# Patient Record
Sex: FEMALE | Race: BLACK OR AFRICAN AMERICAN | NOT HISPANIC OR LATINO | Employment: STUDENT | URBAN - METROPOLITAN AREA
[De-identification: names, ages, dates, MRNs, and addresses within clinical notes are randomized per-mention and may not be internally consistent; named-entity substitution may affect disease eponyms.]

---

## 2019-02-10 ENCOUNTER — HOSPITAL ENCOUNTER (INPATIENT)
Facility: HOSPITAL | Age: 24
LOS: 2 days | Discharge: HOME | DRG: 639 | End: 2019-02-12
Attending: EMERGENCY MEDICINE | Admitting: INTERNAL MEDICINE
Payer: MEDICAID

## 2019-02-10 DIAGNOSIS — R30.0 DYSURIA: ICD-10-CM

## 2019-02-10 DIAGNOSIS — E10.10 DIABETIC KETOACIDOSIS WITHOUT COMA ASSOCIATED WITH TYPE 1 DIABETES MELLITUS: Primary | ICD-10-CM

## 2019-02-10 LAB
ALBUMIN SERPL BCP-MCNC: 4.5 G/DL
ALLENS TEST: ABNORMAL
ALP SERPL-CCNC: 130 U/L
ALT SERPL W/O P-5'-P-CCNC: 21 U/L
ANION GAP SERPL CALC-SCNC: 15 MMOL/L
ANION GAP SERPL CALC-SCNC: 18 MMOL/L
ANION GAP SERPL CALC-SCNC: 26 MMOL/L
AST SERPL-CCNC: 21 U/L
B-HCG UR QL: NEGATIVE
B-OH-BUTYR BLD STRIP-SCNC: 6.3 MMOL/L
BACTERIA #/AREA URNS HPF: NORMAL /HPF
BASOPHILS # BLD AUTO: 0.03 K/UL
BASOPHILS NFR BLD: 0.2 %
BILIRUB SERPL-MCNC: 0.3 MG/DL
BILIRUB UR QL STRIP: NEGATIVE
BUN SERPL-MCNC: 14 MG/DL
BUN SERPL-MCNC: 20 MG/DL
BUN SERPL-MCNC: 23 MG/DL
CALCIUM SERPL-MCNC: 10.6 MG/DL
CALCIUM SERPL-MCNC: 9.3 MG/DL
CALCIUM SERPL-MCNC: 9.4 MG/DL
CHLORIDE SERPL-SCNC: 101 MMOL/L
CHLORIDE SERPL-SCNC: 111 MMOL/L
CHLORIDE SERPL-SCNC: 114 MMOL/L
CLARITY UR: CLEAR
CO2 SERPL-SCNC: 10 MMOL/L
CO2 SERPL-SCNC: 8 MMOL/L
CO2 SERPL-SCNC: 9 MMOL/L
COLOR UR: YELLOW
CREAT SERPL-MCNC: 0.9 MG/DL
CREAT SERPL-MCNC: 1 MG/DL
CREAT SERPL-MCNC: 1.4 MG/DL
CTP QC/QA: YES
DIFFERENTIAL METHOD: ABNORMAL
EOSINOPHIL # BLD AUTO: 0 K/UL
EOSINOPHIL NFR BLD: 0.3 %
ERYTHROCYTE [DISTWIDTH] IN BLOOD BY AUTOMATED COUNT: 12.9 %
EST. GFR  (AFRICAN AMERICAN): >60 ML/MIN/1.73 M^2
EST. GFR  (NON AFRICAN AMERICAN): 52 ML/MIN/1.73 M^2
EST. GFR  (NON AFRICAN AMERICAN): >60 ML/MIN/1.73 M^2
EST. GFR  (NON AFRICAN AMERICAN): >60 ML/MIN/1.73 M^2
ESTIMATED AVG GLUCOSE: 283 MG/DL
GLUCOSE SERPL-MCNC: 168 MG/DL
GLUCOSE SERPL-MCNC: 293 MG/DL
GLUCOSE SERPL-MCNC: 696 MG/DL
GLUCOSE UR QL STRIP: ABNORMAL
HBA1C MFR BLD HPLC: 11.5 %
HCO3 UR-SCNC: 11.1 MMOL/L (ref 24–28)
HCT VFR BLD AUTO: 43.8 %
HGB BLD-MCNC: 13.6 G/DL
HGB UR QL STRIP: ABNORMAL
KETONES UR QL STRIP: ABNORMAL
LACTATE SERPL-SCNC: 2.8 MMOL/L
LEUKOCYTE ESTERASE UR QL STRIP: NEGATIVE
LIPASE SERPL-CCNC: 20 U/L
LYMPHOCYTES # BLD AUTO: 2.4 K/UL
LYMPHOCYTES NFR BLD: 19.3 %
MAGNESIUM SERPL-MCNC: 2.4 MG/DL
MCH RBC QN AUTO: 30.4 PG
MCHC RBC AUTO-ENTMCNC: 31.1 G/DL
MCV RBC AUTO: 98 FL
MICROSCOPIC COMMENT: NORMAL
MONOCYTES # BLD AUTO: 0.6 K/UL
MONOCYTES NFR BLD: 5.1 %
NEUTROPHILS # BLD AUTO: 9.1 K/UL
NEUTROPHILS NFR BLD: 74.8 %
NITRITE UR QL STRIP: NEGATIVE
PCO2 BLDA: 34.5 MMHG (ref 35–45)
PH SMN: 7.12 [PH] (ref 7.35–7.45)
PH UR STRIP: 6 [PH] (ref 5–8)
PLATELET # BLD AUTO: 336 K/UL
PMV BLD AUTO: 11.7 FL
PO2 BLDA: 31 MMHG (ref 40–60)
POC BE: -18 MMOL/L
POC SATURATED O2: 42 % (ref 95–100)
POC TCO2: 12 MMOL/L (ref 24–29)
POCT GLUCOSE: 130 MG/DL (ref 70–110)
POCT GLUCOSE: 151 MG/DL (ref 70–110)
POCT GLUCOSE: 175 MG/DL (ref 70–110)
POCT GLUCOSE: 175 MG/DL (ref 70–110)
POCT GLUCOSE: 178 MG/DL (ref 70–110)
POCT GLUCOSE: 179 MG/DL (ref 70–110)
POCT GLUCOSE: 184 MG/DL (ref 70–110)
POCT GLUCOSE: 265 MG/DL (ref 70–110)
POCT GLUCOSE: 318 MG/DL (ref 70–110)
POCT GLUCOSE: 455 MG/DL (ref 70–110)
POTASSIUM SERPL-SCNC: 4.5 MMOL/L
POTASSIUM SERPL-SCNC: 4.6 MMOL/L
POTASSIUM SERPL-SCNC: 5 MMOL/L
PROT SERPL-MCNC: 8.7 G/DL
PROT UR QL STRIP: NEGATIVE
RBC # BLD AUTO: 4.47 M/UL
RBC #/AREA URNS HPF: 1 /HPF (ref 0–4)
SAMPLE: ABNORMAL
SITE: ABNORMAL
SODIUM SERPL-SCNC: 136 MMOL/L
SODIUM SERPL-SCNC: 136 MMOL/L
SODIUM SERPL-SCNC: 140 MMOL/L
SP GR UR STRIP: 1.01 (ref 1–1.03)
SQUAMOUS #/AREA URNS HPF: 5 /HPF
T4 FREE SERPL-MCNC: 0.87 NG/DL
TSH SERPL DL<=0.005 MIU/L-ACNC: 0.34 UIU/ML
URN SPEC COLLECT METH UR: ABNORMAL
UROBILINOGEN UR STRIP-ACNC: NEGATIVE EU/DL
WBC # BLD AUTO: 12.23 K/UL
WBC #/AREA URNS HPF: 5 /HPF (ref 0–5)
YEAST URNS QL MICRO: NORMAL

## 2019-02-10 PROCEDURE — 63600175 PHARM REV CODE 636 W HCPCS: Performed by: EMERGENCY MEDICINE

## 2019-02-10 PROCEDURE — 25000003 PHARM REV CODE 250: Performed by: STUDENT IN AN ORGANIZED HEALTH CARE EDUCATION/TRAINING PROGRAM

## 2019-02-10 PROCEDURE — 82010 KETONE BODYS QUAN: CPT

## 2019-02-10 PROCEDURE — 84443 ASSAY THYROID STIM HORMONE: CPT

## 2019-02-10 PROCEDURE — 84439 ASSAY OF FREE THYROXINE: CPT

## 2019-02-10 PROCEDURE — 96375 TX/PRO/DX INJ NEW DRUG ADDON: CPT

## 2019-02-10 PROCEDURE — 83605 ASSAY OF LACTIC ACID: CPT

## 2019-02-10 PROCEDURE — 96365 THER/PROPH/DIAG IV INF INIT: CPT

## 2019-02-10 PROCEDURE — 99900035 HC TECH TIME PER 15 MIN (STAT)

## 2019-02-10 PROCEDURE — 96361 HYDRATE IV INFUSION ADD-ON: CPT

## 2019-02-10 PROCEDURE — 85025 COMPLETE CBC W/AUTO DIFF WBC: CPT

## 2019-02-10 PROCEDURE — 83036 HEMOGLOBIN GLYCOSYLATED A1C: CPT

## 2019-02-10 PROCEDURE — 25000003 PHARM REV CODE 250: Performed by: EMERGENCY MEDICINE

## 2019-02-10 PROCEDURE — 94761 N-INVAS EAR/PLS OXIMETRY MLT: CPT

## 2019-02-10 PROCEDURE — 83690 ASSAY OF LIPASE: CPT

## 2019-02-10 PROCEDURE — 81000 URINALYSIS NONAUTO W/SCOPE: CPT

## 2019-02-10 PROCEDURE — 63600175 PHARM REV CODE 636 W HCPCS: Performed by: NURSE PRACTITIONER

## 2019-02-10 PROCEDURE — 87040 BLOOD CULTURE FOR BACTERIA: CPT

## 2019-02-10 PROCEDURE — S5010 5% DEXTROSE AND 0.45% SALINE: HCPCS | Performed by: STUDENT IN AN ORGANIZED HEALTH CARE EDUCATION/TRAINING PROGRAM

## 2019-02-10 PROCEDURE — 82962 GLUCOSE BLOOD TEST: CPT

## 2019-02-10 PROCEDURE — 36415 COLL VENOUS BLD VENIPUNCTURE: CPT

## 2019-02-10 PROCEDURE — 96366 THER/PROPH/DIAG IV INF ADDON: CPT

## 2019-02-10 PROCEDURE — 25000003 PHARM REV CODE 250: Performed by: NURSE PRACTITIONER

## 2019-02-10 PROCEDURE — 20000000 HC ICU ROOM

## 2019-02-10 PROCEDURE — 63600175 PHARM REV CODE 636 W HCPCS: Performed by: STUDENT IN AN ORGANIZED HEALTH CARE EDUCATION/TRAINING PROGRAM

## 2019-02-10 PROCEDURE — 80048 BASIC METABOLIC PNL TOTAL CA: CPT | Mod: 91

## 2019-02-10 PROCEDURE — 83735 ASSAY OF MAGNESIUM: CPT

## 2019-02-10 PROCEDURE — 82803 BLOOD GASES ANY COMBINATION: CPT

## 2019-02-10 PROCEDURE — 81025 URINE PREGNANCY TEST: CPT | Performed by: NURSE PRACTITIONER

## 2019-02-10 PROCEDURE — 99285 EMERGENCY DEPT VISIT HI MDM: CPT | Mod: 25

## 2019-02-10 PROCEDURE — 80053 COMPREHEN METABOLIC PANEL: CPT

## 2019-02-10 RX ORDER — INSULIN ASPART 100 [IU]/ML
1-8 INJECTION, SUSPENSION SUBCUTANEOUS
Status: ON HOLD | COMMUNITY
End: 2019-02-12 | Stop reason: HOSPADM

## 2019-02-10 RX ORDER — DEXTROSE MONOHYDRATE AND SODIUM CHLORIDE 5; .45 G/100ML; G/100ML
INJECTION, SOLUTION INTRAVENOUS CONTINUOUS
Status: DISCONTINUED | OUTPATIENT
Start: 2019-02-10 | End: 2019-02-11

## 2019-02-10 RX ORDER — SODIUM CHLORIDE, SODIUM LACTATE, POTASSIUM CHLORIDE, CALCIUM CHLORIDE 600; 310; 30; 20 MG/100ML; MG/100ML; MG/100ML; MG/100ML
INJECTION, SOLUTION INTRAVENOUS CONTINUOUS
Status: DISCONTINUED | OUTPATIENT
Start: 2019-02-10 | End: 2019-02-10

## 2019-02-10 RX ORDER — IBUPROFEN 200 MG
16 TABLET ORAL
Status: DISCONTINUED | OUTPATIENT
Start: 2019-02-10 | End: 2019-02-12 | Stop reason: HOSPADM

## 2019-02-10 RX ORDER — SODIUM CHLORIDE 0.9 % (FLUSH) 0.9 %
5 SYRINGE (ML) INJECTION
Status: DISCONTINUED | OUTPATIENT
Start: 2019-02-10 | End: 2019-02-12 | Stop reason: HOSPADM

## 2019-02-10 RX ORDER — IBUPROFEN 200 MG
24 TABLET ORAL
Status: DISCONTINUED | OUTPATIENT
Start: 2019-02-10 | End: 2019-02-12 | Stop reason: HOSPADM

## 2019-02-10 RX ORDER — ONDANSETRON 2 MG/ML
4 INJECTION INTRAMUSCULAR; INTRAVENOUS
Status: COMPLETED | OUTPATIENT
Start: 2019-02-10 | End: 2019-02-10

## 2019-02-10 RX ORDER — ONDANSETRON 2 MG/ML
4 INJECTION INTRAMUSCULAR; INTRAVENOUS
Status: ACTIVE | OUTPATIENT
Start: 2019-02-10 | End: 2019-02-10

## 2019-02-10 RX ORDER — DEXTROSE MONOHYDRATE 100 MG/ML
1000 INJECTION, SOLUTION INTRAVENOUS
Status: DISCONTINUED | OUTPATIENT
Start: 2019-02-10 | End: 2019-02-12 | Stop reason: HOSPADM

## 2019-02-10 RX ORDER — GLUCAGON 1 MG
1 KIT INJECTION
Status: DISCONTINUED | OUTPATIENT
Start: 2019-02-10 | End: 2019-02-12 | Stop reason: HOSPADM

## 2019-02-10 RX ORDER — HEPARIN SODIUM 5000 [USP'U]/ML
5000 INJECTION, SOLUTION INTRAVENOUS; SUBCUTANEOUS EVERY 12 HOURS
Status: DISCONTINUED | OUTPATIENT
Start: 2019-02-10 | End: 2019-02-12 | Stop reason: HOSPADM

## 2019-02-10 RX ADMIN — HEPARIN SODIUM 5000 UNITS: 5000 INJECTION, SOLUTION INTRAVENOUS; SUBCUTANEOUS at 09:02

## 2019-02-10 RX ADMIN — SODIUM CHLORIDE 1000 ML: 0.9 INJECTION, SOLUTION INTRAVENOUS at 10:02

## 2019-02-10 RX ADMIN — ONDANSETRON 4 MG: 2 INJECTION INTRAMUSCULAR; INTRAVENOUS at 10:02

## 2019-02-10 RX ADMIN — SODIUM CHLORIDE 4 UNITS/HR: 9 INJECTION, SOLUTION INTRAVENOUS at 12:02

## 2019-02-10 RX ADMIN — SODIUM CHLORIDE, SODIUM LACTATE, POTASSIUM CHLORIDE, AND CALCIUM CHLORIDE: .6; .31; .03; .02 INJECTION, SOLUTION INTRAVENOUS at 12:02

## 2019-02-10 RX ADMIN — SODIUM CHLORIDE 1000 ML: 0.9 INJECTION, SOLUTION INTRAVENOUS at 11:02

## 2019-02-10 RX ADMIN — DEXTROSE AND SODIUM CHLORIDE: 5; .45 INJECTION, SOLUTION INTRAVENOUS at 04:02

## 2019-02-10 NOTE — ED NOTES
Pt with nausea/vomiting that began this am.  Has been out of short acting insulin for approx 3 weeks.  Reports burning with urination and very slight abdominal cramping.  Hx of type 1 diabetes

## 2019-02-10 NOTE — LETTER
February 12, 2019    Karie Perez  [address]  [place] [address]                   [address] [place] [address]  Phone: [phone]  Fax: [phone]   February 12, 2019     Patient: Karie Perez   YOB: 1995   Date of Visit: 2/10/2019       To Whom it May Concern:    Karie Perez was seen in the hospital from 2/10/2019 til 2/12/2019. She may return to school on 2/13/2019.    If you have any questions or concerns, please don't hesitate to call.    Sincerely,         Vasu Blair MD

## 2019-02-10 NOTE — H&P
"[facility] Medicine H&P Note     Admitting Team: [facility]   Attending Physician: Fer Wills MD  Resident: Shi/Elian  Intern: Geraldo    Date of Admit: 2/10/2019    Chief Complaint     Vomiting (n/v that began this am with hx of Juvenile diabetes)   x1 morning     Subjective:      History of Present Illness:  Karie Perez is a 23 y.o. female who has a history of T1DM, pancreatitis and pyelonephritis. The patient presented to [facility] on 2/10/2019 with a primary complaint of vomiting x1 day.     The patient was in her usual state of health until the morning of admit when she woke up with acute onset of non-bloody, non-bilious vomiting. She reports she had several episodes prior to coming to hospital. Denies abdominal pain, nausea, decreased po intake, polyuria or polydipsia prior to admit. She has a history of T1DM and is prescribed Novolog 1-8U with meals and Basaglar 35AM/35PM but has been out of her short-acting insulin x2 weeks. She has had prior hospital admissions for DKA, though does not remember the last time, and says today's presentation is consistent. Her blood sugars at home have been running in the 300s but did not check this morning. She also complains of dysuria and increased frequency x several days, denies fever, chills or hematuria.       Past Medical History:  T1DM  Pancreatitis  Pyelonephritis, ~    Past Surgical History:  , 2018  Tympanostomy tube bilateral, remote    Allergies:  Ceclor - hives     Home Medications:  Novolog 8 U with meals  Basaglar 35AM/35PM  MV    Family History:  Mother - alive, healthy  Father - alive, healthy  Paternal grandfather - DM, HTN     Social History:  Tobacco - former, quit 1 week ago, 3 black and mild cigars/day x 2 years   EtOH - "occasional" bottle of beer, denies binge history  Drugs - denies IVDU, THC, cocaine   Lives with mother and infant son.     Review of Systems:  Pertinent " positives as noted in HPI. All other systems are reviewed and are negative.    Health Maintaince :   Primary Care Physician: Dr. Alexa Argueta, [facility]     Immunizations:   TDap: up to date   Flu: not up to date   Pna: not indicated     Cancer Screening:  PAP: up to date   MMG: not indicated   Colonoscopy: not indicated      Objective:   Last 24 Hour Vital Signs:  BP  Min: 105/65  Max: 118/59  Temp  Av.5 °F (36.9 °C)  Min: 98.5 °F (36.9 °C)  Max: 98.5 °F (36.9 °C)  Pulse  Av.5  Min: 99  Max: 112  Resp  Av  Min: 16  Max: 16  SpO2  Av.7 %  Min: 99 %  Max: 100 %  Height  Av' (152.4 cm)  Min: 5' (152.4 cm)  Max: 5' (152.4 cm)  Weight  Av.1 kg (106 lb)  Min: 48.1 kg (106 lb)  Max: 48.1 kg (106 lb)  Body mass index is 20.7 kg/m².  No intake/output data recorded.    Physical Examination:  Physical Exam   Constitutional: She is oriented to person, place, and time and well-developed, well-nourished, and in no distress. No distress.   HENT:   Head: Normocephalic and atraumatic.   Mouth/Throat: Oropharynx is clear and moist. No oropharyngeal exudate.   Eyes: Conjunctivae and EOM are normal. Pupils are equal, round, and reactive to light. No scleral icterus.   Neck: Normal range of motion. Neck supple. No JVD present. No thyromegaly present.   Cardiovascular: Normal rate, regular rhythm, normal heart sounds and intact distal pulses. Exam reveals no gallop and no friction rub.   No murmur heard.  Pulmonary/Chest: Effort normal and breath sounds normal. No stridor. No respiratory distress. She has no wheezes. She has no rales.   Abdominal: Soft. Bowel sounds are normal. She exhibits no distension and no mass. There is no tenderness. There is no rebound and no guarding.   Musculoskeletal: Normal range of motion. She exhibits no edema.   Lymphadenopathy:     She has no cervical adenopathy.   Neurological: She is alert and oriented to person, place, and time. GCS score  is 15.   Skin: Skin is warm and dry. No rash noted. She is not diaphoretic. No erythema. No pallor.     Laboratory:  Most Recent Data:  CBC:   Lab Results   Component Value Date    WBC 12.23 02/10/2019    HGB 13.6 02/10/2019    HCT 43.8 02/10/2019     02/10/2019    MCV 98 02/10/2019    RDW 12.9 02/10/2019     WBC Differential: 74.8% N, 19.3% L, 5.1% M, 0.3% Eo, 0.2% Baso  BMP:   Lab Results   Component Value Date     02/10/2019    K 4.6 02/10/2019     02/10/2019    CO2 9 (LL) 02/10/2019    BUN 23 (H) 02/10/2019    CREATININE 1.4 02/10/2019     (HH) 02/10/2019    CALCIUM 10.6 (H) 02/10/2019    MG 2.4 02/10/2019     LFTs:   Lab Results   Component Value Date    PROT 8.7 (H) 02/10/2019    ALBUMIN 4.5 02/10/2019    BILITOT 0.3 02/10/2019    AST 21 02/10/2019    ALKPHOS 130 02/10/2019    ALT 21 02/10/2019     Coags: No results found for: INR, PROTIME, PTT  FLP: No results found for: CHOL, HDL, LDLCALC, TRIG, CHOLHDL  DM:   Lab Results   Component Value Date    CREATININE 1.4 02/10/2019     Thyroid: No results found for: TSH, FREET4, F9VSFOR, Q2ZWTTR, THYROIDAB  Anemia: No results found for: IRON, TIBC, FERRITIN, STZITTIU27, FOLATE  Cardiac: No results found for: TROPONINI, CKTOTAL, CKMB, BNP  Urinalysis:   Lab Results   Component Value Date    COLORU Yellow 02/10/2019    SPECGRAV 1.015 02/10/2019    NITRITE Negative 02/10/2019    KETONESU 3+ (A) 02/10/2019    UROBILINOGEN Negative 02/10/2019    WBCUA 5 02/10/2019       Trended Lab Data:  Recent Labs   Lab 02/10/19  1034   WBC 12.23   HGB 13.6   HCT 43.8      MCV 98   RDW 12.9      K 4.6      CO2 9*   BUN 23*   CREATININE 1.4   *   PROT 8.7*   ALBUMIN 4.5   BILITOT 0.3   AST 21   ALKPHOS 130   ALT 21       Trended Cardiac Data:  No results for input(s): TROPONINI, CKTOTAL, CKMB, BNP in the last 168 hours.    Microbiology Data:  None at this time     Other Results:  EKG (my interpretation): none at time of note      Radiology:  None at this time      Assessment:     Karie Perez is a 23 y.o. female who has a history of T1DM, pancreatitis and pyelonephritis. The patient presented to [facility] on 2/10/2019 with a primary complaint of vomiting x1 day.     Plan:     DKA  - pt with history of T1DM, prescribed novolog 1-8U with meals and basaglar 35/35. Has been out of short acting x2 weeks. Home BS >300. Awoke day of admit with several episodes of NBNB vomiting consistent with prior DKA presentations.  - ED BMP with CO2 9  AG 26  glucose 696  Beta-hydroxybutyrate 6.3  Lactate 2.8   - VBG 7.116  34.5  31  11.1   - pt started on insulin gtt and IVFs  - check BMP q4h and BG q2h. Once gap closed and patient able to tolerate po intake, will start on SQ home insulin    Nausea and vomiting, improved   - likely due to above DKA  - zofran 4mg as needed, will continue to monitor     Dysuria   - pt reports history of pyelonephritis ~2014. Also complains of dysuria and frequency x days.   - UA with 3+ glucose, 3+ ketones, trace occult blood. No LE, nitrite or WBC.   - continue to monitor    Hypercalcemia  - on admit Ca2+ 10.6  corrected 10.2   - likely 2/2 dehydration, on IVFs  - continue to monitor    Elevated total protein   - on admit TP 8.7  - likely 2/2 dehydration, on IVFs  - will check HIV and Hepatitis panels  - continue to monitor    HCM   - PCP: Dr. Alexa Argueta  - Immunizations: UTD Tetanus, needs flu  - Pap: UTD  - MMG and c-scope: not indicated    Diet: NPO  PPx: Lovenox  Code: FULL     Dispo: pending clinical improvement, currently on insulin drip. Will check BMP q4 and blood glucose q2, start on SQ insulin once AG closed and tolerating diet.         Tamera Johnston MD  [facility]  [facility]     [facility] Medicine Hospitalist Pager numbers:   [facility] Hospitalist Medicine Team A (Doug/Shi): [phone]  [facility] Hospitalist Medicine Team B (Wojciech/Elma):   [phone]

## 2019-02-10 NOTE — ED PROVIDER NOTES
Encounter Date: 2/10/2019       History     Chief Complaint   Patient presents with    Vomiting     n/v that began this am with hx of Juvenile diabetes     Patient 23-year-old female past medical history of asthma and diabetes who presents to ED for evaluation of nausea and vomiting with this a.m. Patient reports three episodes of non-bloody, non-bilious vomiting this am. Denies any sick contacts. Patient is on 1-8 sliding scale of Humalog daily and takes 35 units of Lantus in the morning and at night. Patient reports that she has been out of her Humalog for approximately 2 weeks and has tried to get a hold of her PCP but has not been able to.  Patient denies any alleviating exacerbating factors.  Associates a little abdominal pain, dysuria, and SOB.  Denies fever, chills, body aches, neck pain/stiffness, chest pain, diarrhea, or any other concerns.      The history is provided by the patient.     Review of patient's allergies indicates:   Allergen Reactions    Ceclor [cefaclor] Hives     Past Medical History:   Diagnosis Date    Asthma     Diabetes mellitus      Past Surgical History:   Procedure Laterality Date    TYMPANOSTOMY TUBE PLACEMENT       Family History   Problem Relation Age of Onset    Diabetes Paternal Grandfather     Hypertension Paternal Grandfather      Social History     Tobacco Use    Smoking status: Never Smoker   Substance Use Topics    Alcohol use: Yes     Comment: socially    Drug use: No     Review of Systems   Constitutional: Negative for chills and fever.   Respiratory: Positive for shortness of breath.    Cardiovascular: Negative for chest pain.   Gastrointestinal: Positive for abdominal pain (a little ), nausea and vomiting. Negative for diarrhea.   Genitourinary: Positive for dysuria.   Musculoskeletal: Negative for back pain and neck stiffness.   Hematological: Does not bruise/bleed easily.   All other systems reviewed and are negative.      Physical Exam     Initial Vitals  [02/10/19 1017]   BP Pulse Resp Temp SpO2   105/65 (!) 112 16 98.5 °F (36.9 °C) 100 %      MAP       --         Physical Exam    Vitals reviewed.  Constitutional: She appears well-developed and well-nourished. She is cooperative.  Non-toxic appearance. She does not have a sickly appearance. She appears distressed.   HENT:   Head: Atraumatic.   Dry MM.     Eyes: EOM are normal.   Neck: Normal range of motion, full passive range of motion without pain and phonation normal. Neck supple.   Cardiovascular: Regular rhythm. Tachycardia present.    Pulmonary/Chest: Effort normal and breath sounds normal. No respiratory distress.   Abdominal:   Abdomen soft.  Non-distended.   Normal bowel sounds.  No guarding, rigidity, or rebound.  Actively vomiting.    Neurological: She is alert and oriented to person, place, and time.   Skin: Skin is warm, dry and intact.   Psychiatric: She has a normal mood and affect.         ED Course   Procedures  Labs Reviewed   URINALYSIS, REFLEX TO URINE CULTURE - Abnormal; Notable for the following components:       Result Value    Glucose, UA 3+ (*)     Ketones, UA 3+ (*)     Occult Blood UA Trace (*)     All other components within normal limits    Narrative:     Preferred Collection Type->Urine, Clean Catch   CBC W/ AUTO DIFFERENTIAL - Abnormal; Notable for the following components:    MCHC 31.1 (*)     Gran # (ANC) 9.1 (*)     Gran% 74.8 (*)     All other components within normal limits   COMPREHENSIVE METABOLIC PANEL - Abnormal; Notable for the following components:    CO2 9 (*)     Glucose 696 (*)     BUN, Bld 23 (*)     Calcium 10.6 (*)     Total Protein 8.7 (*)     Anion Gap 26 (*)     eGFR if non  52 (*)     All other components within normal limits    Narrative:      CO2, GLUE  critical result(s) called and verbal readback obtained   from Jose Flores RN., 02/10/2019 11:11   BETA - HYDROXYBUTYRATE, SERUM - Abnormal; Notable for the following components:     Beta-Hydroxybutyrate 6.3 (*)     All other components within normal limits   LACTIC ACID, PLASMA - Abnormal; Notable for the following components:    Lactate (Lactic Acid) 2.8 (*)     All other components within normal limits   ISTAT PROCEDURE - Abnormal; Notable for the following components:    POC PH 7.116 (*)     POC PCO2 34.5 (*)     POC PO2 31 (*)     POC HCO3 11.1 (*)     POC SATURATED O2 42 (*)     POC TCO2 12 (*)     All other components within normal limits   CULTURE, BLOOD   LIPASE   MAGNESIUM   MAGNESIUM   URINALYSIS MICROSCOPIC    Narrative:     Preferred Collection Type->Urine, Clean Catch   POCT URINE PREGNANCY   POCT GLUCOSE MONITORING CONTINUOUS   POCT GLUCOSE MONITORING CONTINUOUS          Imaging Results    None          Medical Decision Making:   History:   Old Medical Records: I decided to obtain old medical records.  Initial Assessment:   Patient presents to ED for evaluation of nausea vomiting since this a.m. History of type 1 diabetes and has been out of [facility] for approximately 2 weeks.  Appears distressed and nontoxic.  Afebrile. Dry MM.  Actively vomiting.  Abdomen soft.  Nondistended.  Normal bowel sounds. No guarding, rigidity, or rebound.  Clinical Tests:   Lab Tests: Ordered and Reviewed  ED Management:  POCT pregnancy, UA, labs, IV fluids, zofran, insulin drip, VBG     b-hydroxy 6.3.   Other:   I discussed test(s) with the performing physician.       <> Summary of the Findings: Care of this patient discussed with Dr. Peña who agrees with ED course   I have discussed this case with another health care provider.       <> Summary of the Discussion: Dr. Peña discussed patient's HPI and ED course with [facility] who will admit patient for further evaluation of DKA.   Upon re-evaluation, the patient's status has remained stable.  At this time, I believe the patient should be admitted to [facility]  for further evaluation and management of DKA.   [facility]  contacted and the case was discussed.    Additional recommendations at this time: insulin drip.   Dr. Wills with [facility] agrees with plan and will admit under their service.    The patient was updated with test results, overall impression, and further plan of care.  All questions were answered.  The patient expresses understanding and agrees with current plan.              Attending Attestation:         Attending Critical Care:   Critical Care Times:   ==============================================================  · Total Critical Care Time - exclusive of procedural time: 30 minutes.  ==============================================================  Critical care was necessary to treat or prevent imminent or life-threatening deterioration of the following conditions: metabolic crisis.       Attending ED Notes:   I provided a face to face evaluation of this patient.    I discussed the patient's care with Advanced Practice Clinician.    I reviewed their note and agree with the history, physical, assessment and plan.      This is an emergent evaluation of a patient who presented with nausea and vomiting.                   Clinical Impression:   The encounter diagnosis was Diabetic ketoacidosis without coma associated with type 1 diabetes mellitus.                             Satnam Castillo NP  02/10/19 1148       Remi Peña Jr., MD  02/12/19 2008

## 2019-02-11 LAB
ANION GAP SERPL CALC-SCNC: 10 MMOL/L
ANION GAP SERPL CALC-SCNC: 10 MMOL/L
ANION GAP SERPL CALC-SCNC: 12 MMOL/L
ANION GAP SERPL CALC-SCNC: 13 MMOL/L
ANION GAP SERPL CALC-SCNC: 14 MMOL/L
BACTERIA #/AREA URNS HPF: ABNORMAL /HPF
BASOPHILS # BLD AUTO: 0.04 K/UL
BASOPHILS NFR BLD: 0.4 %
BILIRUB UR QL STRIP: NEGATIVE
BUN SERPL-MCNC: 12 MG/DL
BUN SERPL-MCNC: 4 MG/DL
BUN SERPL-MCNC: 5 MG/DL
BUN SERPL-MCNC: 8 MG/DL
BUN SERPL-MCNC: 9 MG/DL
CALCIUM SERPL-MCNC: 8.4 MG/DL
CALCIUM SERPL-MCNC: 8.7 MG/DL
CALCIUM SERPL-MCNC: 8.8 MG/DL
CALCIUM SERPL-MCNC: 8.8 MG/DL
CALCIUM SERPL-MCNC: 9.1 MG/DL
CHLORIDE SERPL-SCNC: 107 MMOL/L
CHLORIDE SERPL-SCNC: 108 MMOL/L
CHLORIDE SERPL-SCNC: 108 MMOL/L
CHLORIDE SERPL-SCNC: 109 MMOL/L
CHLORIDE SERPL-SCNC: 109 MMOL/L
CLARITY UR: CLEAR
CO2 SERPL-SCNC: 12 MMOL/L
CO2 SERPL-SCNC: 13 MMOL/L
CO2 SERPL-SCNC: 13 MMOL/L
CO2 SERPL-SCNC: 18 MMOL/L
CO2 SERPL-SCNC: 19 MMOL/L
COLOR UR: YELLOW
CREAT SERPL-MCNC: 0.8 MG/DL
CREAT SERPL-MCNC: 0.9 MG/DL
DIFFERENTIAL METHOD: ABNORMAL
EOSINOPHIL # BLD AUTO: 0.1 K/UL
EOSINOPHIL NFR BLD: 0.7 %
ERYTHROCYTE [DISTWIDTH] IN BLOOD BY AUTOMATED COUNT: 12.9 %
EST. GFR  (AFRICAN AMERICAN): >60 ML/MIN/1.73 M^2
EST. GFR  (NON AFRICAN AMERICAN): >60 ML/MIN/1.73 M^2
GLUCOSE SERPL-MCNC: 178 MG/DL
GLUCOSE SERPL-MCNC: 211 MG/DL
GLUCOSE SERPL-MCNC: 218 MG/DL
GLUCOSE SERPL-MCNC: 225 MG/DL
GLUCOSE SERPL-MCNC: 237 MG/DL
GLUCOSE UR QL STRIP: ABNORMAL
HCT VFR BLD AUTO: 39.3 %
HGB BLD-MCNC: 12.7 G/DL
HGB UR QL STRIP: ABNORMAL
KETONES UR QL STRIP: ABNORMAL
LACTATE SERPL-SCNC: 0.6 MMOL/L
LEUKOCYTE ESTERASE UR QL STRIP: ABNORMAL
LYMPHOCYTES # BLD AUTO: 3.1 K/UL
LYMPHOCYTES NFR BLD: 27.3 %
MCH RBC QN AUTO: 30.4 PG
MCHC RBC AUTO-ENTMCNC: 32.3 G/DL
MCV RBC AUTO: 94 FL
MICROSCOPIC COMMENT: ABNORMAL
MONOCYTES # BLD AUTO: 1.1 K/UL
MONOCYTES NFR BLD: 9.3 %
NEUTROPHILS # BLD AUTO: 7.1 K/UL
NEUTROPHILS NFR BLD: 62.2 %
NITRITE UR QL STRIP: NEGATIVE
PH UR STRIP: 6 [PH] (ref 5–8)
PLATELET # BLD AUTO: 298 K/UL
PMV BLD AUTO: 10.9 FL
POCT GLUCOSE: 111 MG/DL (ref 70–110)
POCT GLUCOSE: 171 MG/DL (ref 70–110)
POCT GLUCOSE: 179 MG/DL (ref 70–110)
POCT GLUCOSE: 190 MG/DL (ref 70–110)
POCT GLUCOSE: 194 MG/DL (ref 70–110)
POCT GLUCOSE: 200 MG/DL (ref 70–110)
POCT GLUCOSE: 200 MG/DL (ref 70–110)
POCT GLUCOSE: 208 MG/DL (ref 70–110)
POCT GLUCOSE: 208 MG/DL (ref 70–110)
POCT GLUCOSE: 209 MG/DL (ref 70–110)
POCT GLUCOSE: 209 MG/DL (ref 70–110)
POCT GLUCOSE: 213 MG/DL (ref 70–110)
POCT GLUCOSE: 217 MG/DL (ref 70–110)
POCT GLUCOSE: 220 MG/DL (ref 70–110)
POCT GLUCOSE: 224 MG/DL (ref 70–110)
POCT GLUCOSE: 225 MG/DL (ref 70–110)
POCT GLUCOSE: 75 MG/DL (ref 70–110)
POTASSIUM SERPL-SCNC: 3.4 MMOL/L
POTASSIUM SERPL-SCNC: 3.5 MMOL/L
POTASSIUM SERPL-SCNC: 3.8 MMOL/L
POTASSIUM SERPL-SCNC: 3.8 MMOL/L
POTASSIUM SERPL-SCNC: 4.1 MMOL/L
PROT UR QL STRIP: NEGATIVE
RBC # BLD AUTO: 4.18 M/UL
RBC #/AREA URNS HPF: 2 /HPF (ref 0–4)
SODIUM SERPL-SCNC: 133 MMOL/L
SODIUM SERPL-SCNC: 134 MMOL/L
SODIUM SERPL-SCNC: 135 MMOL/L
SODIUM SERPL-SCNC: 136 MMOL/L
SODIUM SERPL-SCNC: 137 MMOL/L
SP GR UR STRIP: 1.02 (ref 1–1.03)
SQUAMOUS #/AREA URNS HPF: 8 /HPF
URN SPEC COLLECT METH UR: ABNORMAL
UROBILINOGEN UR STRIP-ACNC: NEGATIVE EU/DL
WBC # BLD AUTO: 11.41 K/UL
WBC #/AREA URNS HPF: 7 /HPF (ref 0–5)

## 2019-02-11 PROCEDURE — S5571 INSULIN DISPOS PEN 3 ML: HCPCS | Performed by: STUDENT IN AN ORGANIZED HEALTH CARE EDUCATION/TRAINING PROGRAM

## 2019-02-11 PROCEDURE — 63600175 PHARM REV CODE 636 W HCPCS: Performed by: STUDENT IN AN ORGANIZED HEALTH CARE EDUCATION/TRAINING PROGRAM

## 2019-02-11 PROCEDURE — 25000003 PHARM REV CODE 250: Performed by: STUDENT IN AN ORGANIZED HEALTH CARE EDUCATION/TRAINING PROGRAM

## 2019-02-11 PROCEDURE — 11000001 HC ACUTE MED/SURG PRIVATE ROOM

## 2019-02-11 PROCEDURE — S5010 5% DEXTROSE AND 0.45% SALINE: HCPCS | Performed by: STUDENT IN AN ORGANIZED HEALTH CARE EDUCATION/TRAINING PROGRAM

## 2019-02-11 PROCEDURE — 85025 COMPLETE CBC W/AUTO DIFF WBC: CPT

## 2019-02-11 PROCEDURE — 81000 URINALYSIS NONAUTO W/SCOPE: CPT

## 2019-02-11 PROCEDURE — 80048 BASIC METABOLIC PNL TOTAL CA: CPT | Mod: 91

## 2019-02-11 PROCEDURE — 80048 BASIC METABOLIC PNL TOTAL CA: CPT

## 2019-02-11 PROCEDURE — 36415 COLL VENOUS BLD VENIPUNCTURE: CPT

## 2019-02-11 PROCEDURE — 83605 ASSAY OF LACTIC ACID: CPT

## 2019-02-11 PROCEDURE — 94761 N-INVAS EAR/PLS OXIMETRY MLT: CPT

## 2019-02-11 RX ORDER — GUAIFENESIN 600 MG/1
600 TABLET, EXTENDED RELEASE ORAL 2 TIMES DAILY
Status: DISCONTINUED | OUTPATIENT
Start: 2019-02-11 | End: 2019-02-12 | Stop reason: HOSPADM

## 2019-02-11 RX ORDER — ACETAMINOPHEN 325 MG/1
650 TABLET ORAL EVERY 6 HOURS PRN
Status: DISCONTINUED | OUTPATIENT
Start: 2019-02-11 | End: 2019-02-12 | Stop reason: HOSPADM

## 2019-02-11 RX ORDER — INSULIN ASPART 100 [IU]/ML
10 INJECTION, SOLUTION INTRAVENOUS; SUBCUTANEOUS
Status: DISCONTINUED | OUTPATIENT
Start: 2019-02-11 | End: 2019-02-12

## 2019-02-11 RX ADMIN — GUAIFENESIN 600 MG: 600 TABLET, EXTENDED RELEASE ORAL at 11:02

## 2019-02-11 RX ADMIN — INSULIN DETEMIR 40 UNITS: 100 INJECTION, SOLUTION SUBCUTANEOUS at 02:02

## 2019-02-11 RX ADMIN — HEPARIN SODIUM 5000 UNITS: 5000 INJECTION, SOLUTION INTRAVENOUS; SUBCUTANEOUS at 08:02

## 2019-02-11 RX ADMIN — SODIUM BICARBONATE: 84 INJECTION, SOLUTION INTRAVENOUS at 11:02

## 2019-02-11 RX ADMIN — DEXTROSE AND SODIUM CHLORIDE: 5; .45 INJECTION, SOLUTION INTRAVENOUS at 06:02

## 2019-02-11 RX ADMIN — DEXTROSE AND SODIUM CHLORIDE: 5; .45 INJECTION, SOLUTION INTRAVENOUS at 12:02

## 2019-02-11 RX ADMIN — GUAIFENESIN 600 MG: 600 TABLET, EXTENDED RELEASE ORAL at 08:02

## 2019-02-11 RX ADMIN — ACETAMINOPHEN 650 MG: 325 TABLET ORAL at 11:02

## 2019-02-11 NOTE — PLAN OF CARE
Problem: Fall Injury Risk  Goal: Absence of Fall and Fall-Related Injury  Outcome: Ongoing (interventions implemented as appropriate)  Intervention: Identify and Manage Contributors to Fall Injury Risk   02/11/19 0326   Manage Acute Allergic Reaction   Medication Review/Management medications reviewed   Identify and Manage Contributors to Fall Injury Risk   Self-Care Promotion independence encouraged         Problem: Infection  Goal: Infection Symptom Resolution  Outcome: Ongoing (interventions implemented as appropriate)  Intervention: Prevent or Manage Infection   02/11/19 0326   Prevent or Manage Infection   Infection Management aseptic technique maintained

## 2019-02-11 NOTE — NURSING
Dr. Wills's team notified of anion gap- 15, potassium 4.5, Co2- 10, POCT glucose 184, D5 0.45% NS infusing at 150ml/hr, and insulin infusion at 1 unit/hr; no new orders at this time, instructed to continue to monitor

## 2019-02-11 NOTE — PLAN OF CARE
Problem: Diabetes Comorbidity  Goal: Blood Glucose Level Within Desired Range  Outcome: Ongoing (interventions implemented as appropriate)  Intervention: Maintain Glycemic Control   02/11/19 0325   Monitor and Manage Ketoacidosis   Glycemic Management blood glucose monitoring;insulin infusion adjusted

## 2019-02-11 NOTE — PLAN OF CARE
"Pt reports she has an 8 month old and has been independent with all adls including driving. Pt stated her mother can provide assistance and she will have transportation home when d/c. Pt informed Tn she "ran out" of her Insulin because her doctor did not call in another refill and her appointment isn't until 3/21/19. Tn informed pt of the [facility] and pt request to have prescription sent there upon d/c so she can go home with medications.  Tn gave pt d/c brochure and card and encouraged to call for any further needs/questions.      Tn called PCP office and attempted to scheduled follow up appt.  MESSAGE WAS SENT FOR FOLLOW UP WITHIN A WEEK . SOMEONE FROM OFFICE SHOULD CALL PATIENT       02/11/19 2785   Discharge Assessment   Assessment Type Discharge Planning Assessment   Confirmed/corrected address and phone number on facesheet? Yes   Assessment information obtained from? Patient   Expected Length of Stay (days) 2   Communicated expected length of stay with patient/caregiver yes   Prior to hospitilization cognitive status: Alert/Oriented   Prior to hospitalization functional status: Independent   Current cognitive status: Alert/Oriented   Current Functional Status: Needs Assistance   Lives With child(james), dependent   Able to Return to Prior Arrangements yes   Is patient able to care for self after discharge? Yes   Who are your caregiver(s) and their phone number(s)? Della (mother) [phone]   Patient's perception of discharge disposition home or selfcare   Readmission Within the Last 30 Days no previous admission in last 30 days   Patient currently being followed by outpatient case management? No   Patient currently receives any other outside agency services? No   Equipment Currently Used at Home glucometer   Do you have any problems affording any of your prescribed medications? No   Is the patient taking medications as prescribed? no   If no, which medications is patient not taking? " Insulin-    Does the patient have transportation home? Yes   Transportation Anticipated family or friend will provide   Does the patient receive services at the [facility]madin Clinic? No   Discharge Plan A Home with family   Discharge Plan B Home with family   DME Needed Upon Discharge  none   Patient/Family in Agreement with Plan yes

## 2019-02-11 NOTE — PLAN OF CARE
Problem: Adult Inpatient Plan of Care  Goal: Plan of Care Review  Outcome: Ongoing (interventions implemented as appropriate)  Pt weaned off insulin gtt today. Plan to transfer to floor once bed available. Plan of care reviewed with patient.

## 2019-02-12 VITALS
RESPIRATION RATE: 18 BRPM | SYSTOLIC BLOOD PRESSURE: 129 MMHG | HEIGHT: 60 IN | OXYGEN SATURATION: 91 % | HEART RATE: 95 BPM | BODY MASS INDEX: 44.75 KG/M2 | TEMPERATURE: 97 F | WEIGHT: 227.94 LBS | DIASTOLIC BLOOD PRESSURE: 81 MMHG

## 2019-02-12 LAB
ALBUMIN SERPL BCP-MCNC: 3.4 G/DL
ALP SERPL-CCNC: 89 U/L
ALT SERPL W/O P-5'-P-CCNC: 18 U/L
ANION GAP SERPL CALC-SCNC: 10 MMOL/L
AST SERPL-CCNC: 24 U/L
BASOPHILS # BLD AUTO: 0.03 K/UL
BASOPHILS NFR BLD: 0.4 %
BILIRUB SERPL-MCNC: 0.5 MG/DL
BUN SERPL-MCNC: 4 MG/DL
CALCIUM SERPL-MCNC: 9.2 MG/DL
CHLORIDE SERPL-SCNC: 107 MMOL/L
CO2 SERPL-SCNC: 24 MMOL/L
CREAT SERPL-MCNC: 0.6 MG/DL
DIFFERENTIAL METHOD: ABNORMAL
EOSINOPHIL # BLD AUTO: 0.2 K/UL
EOSINOPHIL NFR BLD: 2.6 %
ERYTHROCYTE [DISTWIDTH] IN BLOOD BY AUTOMATED COUNT: 12.9 %
EST. GFR  (AFRICAN AMERICAN): >60 ML/MIN/1.73 M^2
EST. GFR  (NON AFRICAN AMERICAN): >60 ML/MIN/1.73 M^2
GLUCOSE SERPL-MCNC: 36 MG/DL
HCT VFR BLD AUTO: 38.7 %
HGB BLD-MCNC: 12.8 G/DL
LYMPHOCYTES # BLD AUTO: 3.6 K/UL
LYMPHOCYTES NFR BLD: 52.4 %
MCH RBC QN AUTO: 30 PG
MCHC RBC AUTO-ENTMCNC: 33.1 G/DL
MCV RBC AUTO: 91 FL
MONOCYTES # BLD AUTO: 0.8 K/UL
MONOCYTES NFR BLD: 11.7 %
NEUTROPHILS # BLD AUTO: 2.2 K/UL
NEUTROPHILS NFR BLD: 32.8 %
PLATELET # BLD AUTO: 302 K/UL
PMV BLD AUTO: 10.9 FL
POCT GLUCOSE: 105 MG/DL (ref 70–110)
POCT GLUCOSE: 117 MG/DL (ref 70–110)
POCT GLUCOSE: 299 MG/DL (ref 70–110)
POCT GLUCOSE: 34 MG/DL (ref 70–110)
POCT GLUCOSE: 93 MG/DL (ref 70–110)
POTASSIUM SERPL-SCNC: 2.9 MMOL/L
PROT SERPL-MCNC: 6.8 G/DL
RBC # BLD AUTO: 4.26 M/UL
SODIUM SERPL-SCNC: 141 MMOL/L
WBC # BLD AUTO: 6.85 K/UL

## 2019-02-12 PROCEDURE — 25000003 PHARM REV CODE 250: Performed by: STUDENT IN AN ORGANIZED HEALTH CARE EDUCATION/TRAINING PROGRAM

## 2019-02-12 PROCEDURE — 80053 COMPREHEN METABOLIC PANEL: CPT

## 2019-02-12 PROCEDURE — 85025 COMPLETE CBC W/AUTO DIFF WBC: CPT

## 2019-02-12 PROCEDURE — 63600175 PHARM REV CODE 636 W HCPCS: Performed by: STUDENT IN AN ORGANIZED HEALTH CARE EDUCATION/TRAINING PROGRAM

## 2019-02-12 PROCEDURE — 36415 COLL VENOUS BLD VENIPUNCTURE: CPT

## 2019-02-12 RX ORDER — INSULIN ASPART 100 [IU]/ML
7 INJECTION, SOLUTION INTRAVENOUS; SUBCUTANEOUS
Status: DISCONTINUED | OUTPATIENT
Start: 2019-02-12 | End: 2019-02-12 | Stop reason: HOSPADM

## 2019-02-12 RX ORDER — INSULIN GLARGINE 100 [IU]/ML
25 INJECTION, SOLUTION SUBCUTANEOUS NIGHTLY
Qty: 15 ML | Refills: 2 | Status: SHIPPED | OUTPATIENT
Start: 2019-02-12 | End: 2023-05-03

## 2019-02-12 RX ORDER — POTASSIUM CHLORIDE 20 MEQ/1
40 TABLET, EXTENDED RELEASE ORAL
Status: COMPLETED | OUTPATIENT
Start: 2019-02-12 | End: 2019-02-12

## 2019-02-12 RX ORDER — INSULIN LISPRO 100 [IU]/ML
7 INJECTION, SOLUTION INTRAVENOUS; SUBCUTANEOUS
Qty: 6.3 ML | Refills: 2 | Status: SHIPPED | OUTPATIENT
Start: 2019-02-12 | End: 2020-02-12

## 2019-02-12 RX ORDER — BENZONATATE 100 MG/1
100 CAPSULE ORAL 3 TIMES DAILY PRN
Status: DISCONTINUED | OUTPATIENT
Start: 2019-02-12 | End: 2019-02-12 | Stop reason: HOSPADM

## 2019-02-12 RX ADMIN — INSULIN ASPART 7 UNITS: 100 INJECTION, SOLUTION INTRAVENOUS; SUBCUTANEOUS at 08:02

## 2019-02-12 RX ADMIN — POTASSIUM CHLORIDE 40 MEQ: 20 TABLET, EXTENDED RELEASE ORAL at 12:02

## 2019-02-12 RX ADMIN — POTASSIUM CHLORIDE 40 MEQ: 20 TABLET, EXTENDED RELEASE ORAL at 10:02

## 2019-02-12 RX ADMIN — GUAIFENESIN 600 MG: 600 TABLET, EXTENDED RELEASE ORAL at 08:02

## 2019-02-12 RX ADMIN — Medication 24 G: at 05:02

## 2019-02-12 RX ADMIN — BENZONATATE 100 MG: 100 CAPSULE ORAL at 12:02

## 2019-02-12 RX ADMIN — INSULIN ASPART 7 UNITS: 100 INJECTION, SOLUTION INTRAVENOUS; SUBCUTANEOUS at 12:02

## 2019-02-12 RX ADMIN — ACETAMINOPHEN 650 MG: 325 TABLET ORAL at 12:02

## 2019-02-12 RX ADMIN — HEPARIN SODIUM 5000 UNITS: 5000 INJECTION, SOLUTION INTRAVENOUS; SUBCUTANEOUS at 08:02

## 2019-02-12 RX ADMIN — POTASSIUM CHLORIDE 40 MEQ: 20 TABLET, EXTENDED RELEASE ORAL at 08:02

## 2019-02-12 NOTE — PLAN OF CARE
Discharge order noted, NO HH or DME noted. Endocrinology referral faxed to [facility] referral center [phone].     Discharge rounds on patient. Discussed followup appointments, blue discharge folder, discharge nurse will go over home medications and reasons for medications and final discharge instructions. All patient/caregiver questions answered. Patient verbalized understanding.    Follow-up With  Details  Why  Contact Info   Omar Limon NP  On 2/14/2019  time:3:15pm Hospital follow up  [address]  [address]  [facility] [address]  [phone]   [facility]    follow up with Endocrinology referral  [address] [facility] [address]  [phone]            02/12/19 1430   Final Note   Assessment Type Final Discharge Note   Anticipated Discharge Disposition Home   What phone number can be called within the next 1-3 days to see how you are doing after discharge? [phone]   Hospital Follow Up  Appt(s) scheduled? Yes   [facility] Referral Info   Post Acute Recommendation No Care   Robby Mackey RN-BC  Transitional Navigator  [phone]

## 2019-02-12 NOTE — NURSING
AVS and educational attachments shared with patient via [employer]. Discussed thoroughly. Patient verbalized clear understanding using teach back method. Notified bedside nurse of completion of education. At present no distress noted. Patient to be discharged via w/c with escort service and family with all of patient's belonings. Will cont to be available to patient and family and intervene prn.

## 2019-02-12 NOTE — PROGRESS NOTES
U Internal Medicine Resident HO-2 Progress Note    Subjective:      Karie Perez is a 23 y.o. AA female who is being followed by the [facility] IM service at [facility] for DKA.  Yesterday she was transitioned off the insulin drip and stepped down. Overnight she had glucose recorded at 75 and 93 then dropped to 36 on cmp this am. She felt a little symptomatic and responded to juice. She had been taking 70 units daily of long acting and only got 40 yesterday with 10 units of short acting. It is highly unlikely that she had been compliant with home insulin prior to coming in with this response. Will need to decrease insulin today. She is feeling well otherwise and sitting up and talkative today. She feels ready to go home.      Objective:   Last 24 Hour Vital Signs:  BP  Min: 101/63  Max: 129/59  Temp  Av.7 °F (37.1 °C)  Min: 98.2 °F (36.8 °C)  Max: 99.1 °F (37.3 °C)  Pulse  Av.5  Min: 81  Max: 113  Resp  Av.4  Min: 18  Max: 43  SpO2  Av.3 %  Min: 89 %  Max: 100 %  Weight  Av.4 kg (227 lb 15.3 oz)  Min: 103.4 kg (227 lb 15.3 oz)  Max: 103.4 kg (227 lb 15.3 oz)  I/O last 3 completed shifts:  In: 3507.3 [P.O.:240; I.V.:3267.3]  Out: 2375 [Urine:2375]    Physical Examination:  Gen: awake, alert, NAD, sitting up at bedside   HEENT: normocephalic, atraumatic, PERRL, EOMI, MMM  CV: RRR, no murmurs appreciated, no extra heart sounds  Lungs: CTAB, symmetric chest rise, no wheezing or crackles  GI: Soft, non tender, non distended, normoactive bowel sounds  Extrem: no edema, clubbing, or cyanosis  Skin: dry, warm, intact. No bruising or rashes.  Neuro: AAOx3, moving all extremities, sensation equal and symmetric         Laboratory:  Laboratory Data Reviewed: yes  Pertinent Findings:  Recent Labs   Lab 02/10/19  1034  19  0424  19  1131 19  1622 19  0528   WBC 12.23  --  11.41  --   --   --  6.85   HGB 13.6  --  12.7  --   --   --  12.8   HCT 43.8  --  39.3  --    --   --  38.7     --  298  --   --   --  302      < > 134*   < > 135* 137 141   K 4.6   < > 3.8   < > 3.5 3.4* 2.9*      < > 109   < > 107 108 107   CREATININE 1.4   < > 0.8   < > 0.8 0.8 0.6   BUN 23*   < > 9   < > 5* 4* 4*   CO2 9*   < > 13*   < > 18* 19* 24   ALT 21  --   --   --   --   --  18   AST 21  --   --   --   --   --  24    < > = values in this interval not displayed.         Microbiology Data Reviewed: yes  Pertinent Findings:  BCx NGTD      Radiology Data Reviewed: yes  Pertinent Findings:  No new imaging    Current Medications:     Infusions:       Scheduled:   guaiFENesin  600 mg Oral BID    heparin (porcine)  5,000 Units Subcutaneous Q12H    insulin aspart U-100  10 Units Subcutaneous TIDWM    insulin detemir U-100  40 Units Subcutaneous QHS    potassium chloride  40 mEq Oral Q2H        PRN:  acetaminophen, benzonatate, dextrose 10 % in water (D10W), dextrose 10 % in water (D10W), dextrose 50%, dextrose 50%, dextrose 50%, dextrose 50%, glucagon (human recombinant), glucose, glucose, influenza, sodium chloride 0.9%, sodium chloride 0.9%    Antibiotics and Day Number of Therapy:  None    Lines and Day Number of Therapy:  PIV    Assessment:     Karie Perez is a 23 y.o.female with  Patient Active Problem List    Diagnosis Date Noted    Diabetic ketoacidosis without coma associated with type 1 diabetes mellitus 02/10/2019    Dysuria 02/10/2019        Plan:     DKA  - pt with history of T1DM, prescribed novolog 1-8U with meals and basaglar 35/35. Has been out of short acting x2 weeks. Home BS >300. Awoke day of admit with several episodes of NBNB vomiting consistent with prior DKA presentations.  - ED BMP with CO2 9  AG 26  glucose 696  Beta-hydroxybutyrate 6.3  Lactate 2.8 (now 0.6)  - VBG 7.116  34.5  31  11.1   - pt started on insulin gtt and IVFs  - gap closed, bicarb corrected and stepped down yesterday with 40 levemir and 10 units with meals  - this am hypoglycemic  to 36. Decreased meal time insulin to 7 units      Nausea and vomiting, resolved   - likely due to above DKA  - zofran 4mg as needed, will continue to monitor  - resolved      Dysuria   - pt reports history of pyelonephritis ~2014. Also complains of dysuria and frequency x days.   - UA with 3+ glucose, 3+ ketones, trace occult blood. No LE, nitrite or WBC.   - no infection, resolved symptoms     Hypercalcemia, resolved   - on admit Ca2+ 10.6  now 8.8   - likely 2/2 dehydration, on IVFs  - continue to monitor     Elevated total protein   - on admit TP 8.7  - likely 2/2 dehydration, on IVFs  - will check HIV and Hepatitis panels  - continue to monitor     HCM   - PCP: Dr. Alexa Argueta  - Immunizations: UTD Tetanus, needs flu  - Pap: UTD  - MMG and c-scope: not indicated     Diet: NPO  PPx: Lovenox  Code: FULL      Dispo: decreased meal time insulin. Discharge today.       Ricarda Osullivan  [facility] Internal Medicine HO-2  U Internal Medicine Service Team B    [facility] Medicine Hospitalist Pager numbers:   U Hospitalist Medicine Team A (Doug/Shi): [phone]  [facility] Hospitalist Medicine Team B (Wojciech/Elma):  [phone]

## 2019-02-12 NOTE — DISCHARGE SUMMARY
[facility] Internal Medicine Discharge Summary    Primary Team: [facility] Team B  Attending Physician: Fer Wills MD  Resident: Shi  Intern: Jeannie    Date of Admit: 2/10/2019  Date of Discharge: 2/12/2019    Discharge to: Home  Condition: Good    Discharge Diagnoses     Patient Active Problem List   Diagnosis    Diabetic ketoacidosis without coma associated with type 1 diabetes mellitus    Dysuria       Consultants and Procedures     Consultants:  ANA    Procedures:   NA    Brief History of Present Illness      Karie Perez is a 23 y.o. AA female who  has a past medical history of Asthma and Diabetes mellitus.  The patient presented to [facility] on 2/10/2019 with a primary complaint of Vomiting (n/v that began this am with hx of Juvenile diabetes)  .     Ms. Perez is a 23 year old woman who presented to the ED in DKA. She had been out of her short acting insulin for about 2 weeks. She was admitted to the ICU on an insulin drip for initial glucose of almost 700 and bicarb 9 with beta hydroxybutyrate of 6.3. She tolerated the insulin infusion and fluids well and closed her anion gap and corrected her acidosis. Her home regimen was 35 units basaglar BID with novolog sliding scale. We gave her 40 units of long acting with 10 units meal time insulin which made her hypoglycemic to the 30s. Insulin regimen was changed to 25 units long acting with 7 units meal time insulin which she is tolerating well. She has a referral in to endocrinology.     For the full HPI please refer to the History & Physical from this admission.    Hospital Course By Problem with Pertinent Findings     DKA  - pt with history of T1DM, prescribed novolog 1-8U with meals and basaglar 35/35. Has been out of short acting x2 weeks. Home BS >300. Awoke day of admit with several episodes of NBNB vomiting consistent with prior DKA presentations.  - ED BMP with CO2 9  AG 26  glucose 696  Beta-hydroxybutyrate 6.3  Lactate 2.8 (now  0.6)  - VBG 7.116  34.5  31  11.1   - pt started on insulin gtt and IVFs  - gap closed, bicarb corrected and stepped down yesterday with 40 levemir and 10 units with meals  - this am hypoglycemic to 36. Decreased meal time insulin to 7 units and long acting to 25 units  - referral sent for endocrinology     Nausea and vomiting, resolved   - likely due to above DKA  - resolved with zofran     Dysuria   - pt reports history of pyelonephritis ~2014. Also complains of dysuria and frequency x days.   - UA with 3+ glucose, 3+ ketones, trace occult blood. No LE, nitrite or WBC.   - no infection, resolved symptoms     Hypercalcemia, resolved   - on admit Ca2+ 10.6  now 8.8   - likely 2/2 dehydration, on IVFs  - continue to monitor     Elevated total protein   - on admit TP 8.7  - likely 2/2 dehydration, on IVFs  - will check HIV and Hepatitis panels  - continue to monitor     HCM   - PCP: Dr. Alexa Argueta  - Immunizations: UTD Tetanus, needs flu  - Pap: UTD  - MMG and c-scope: not indicated        Discharge Medications        Medication List      START taking these medications    blood sugar diagnostic Strp  test blood sugar 2 (two) times daily with meals.     insulin aspart U-100 100 unit/mL Inpn pen  Commonly known as:  NovoLOG  Inject 7 Units into the skin 3 (three) times daily with meals.        CHANGE how you take these medications    insulin glargine 100 units/mL (3mL) SubQ pen  Commonly known as:  BASAGLAR KWIKPEN U-100 INSULIN  Inject 25 Units into the skin every evening.  What changed:    · medication strength  · how much to take  · when to take this  · Another medication with the same name was removed. Continue taking this medication, and follow the directions you see here.        CONTINUE taking these medications    HYDROcodone-acetaminophen 5-325 mg per tablet  Commonly known as:  NORCO  Take 1 tablet by mouth every 6 (six) hours as needed for Pain.        STOP taking these medications    insulin aspart  protamine-insulin aspart 100 unit/mL (70-30) Inpn pen  Commonly known as:  NovoLOG 70/30     insulin lispro 100 unit/mL injection        ASK your doctor about these medications    blood-glucose meter Misc  USE AS DIRECTED           Where to Get Your Medications      These medications were sent to [facility] [address] [address]    Hours:  Mon-Fri, 8a-5:30p Phone:  [phone]   · blood sugar diagnostic Strp  · blood-glucose meter Misc  · insulin aspart U-100 100 unit/mL Inpn pen  · insulin glargine 100 units/mL (3mL) SubQ pen         Discharge Information:   Diet:  diabetic    Physical Activity:  No restrictions    Instructions:  1. Take all medications as prescribed  2. Keep all follow-up appointments  3. Return to the hospital or call your primary care physicians if any worsening symptoms such as nausea, increased urination and thirst occur.      Follow-Up Appointments:  See PCP in 1-2 weeks, referral sent for endocrinology     Ricarda Osullivan  [facility] Internal Medicine, -

## 2019-02-12 NOTE — NURSING TRANSFER
Nursing Transfer Note      2/11/2019     Transfer To: 403    Transfer via wheelchair    Transfer with cardiac monitoring    Transported by RN and transport    Medicines sent:  levemir and aspart    Chart send with patient: Yes      Patient reassessed at: to be reassessed on floor    Upon arrival to floor: cardiac monitor applied, patient oriented to room, call bell in reach and bed in lowest position

## 2019-02-12 NOTE — NURSING
Received critical result with POC glucose of 34.   Pt asymptomatic, conversational and alert at this time.    Attending MD notified.  PRN glucose tablets and OJ provided to pt at this time.  Will adhere to hypoglycemia protocol - continue to recheck poc glucose and will continue to monitor.

## 2019-02-12 NOTE — PROGRESS NOTES
.Pharmacy New Medication Education    Patient accepted medication education.    Pharmacy educated patient on name and purpose of medications and possible side effects, using the teach-back method.     D/C acetaminophen tablet 650 mg   D/C benzonatate capsule 100 mg   D/C dextrose 10 % infusion   D/C dextrose 10 % infusion   D/C dextrose 50% injection 12.5 g   D/C dextrose 50% injection 12.5 g   D/C dextrose 50% injection 25 g   D/C dextrose 50% injection 25 g   D/C glucagon (human recombinant) injection 1 mg   D/C glucose chewable tablet 16 g   D/C glucose chewable tablet 24 g   D/C guaiFENesin 12 hr tablet 600 mg   D/C heparin (porcine) injection 5,000 Units   D/C influenza (FLUZONE QUADRIVALENT) vaccine 0.5 mL   D/C insulin aspart U-100 pen 7 Units   D/C insulin detemir U-100 pen 40 Units   D/C potassium chloride SA CR tablet 40 mEq   D/C sodium chloride 0.9% flush 5 mL   D/C sodium chloride 0.9% flush 5 mL       Learners of pharmacy medication education included:  Patient    Patient +/- learner response:  Verbalized Understanding, Teachback

## 2019-02-12 NOTE — DISCHARGE INSTRUCTIONS
Insulin Lispro injection (English) View Edit Remove  Injection Pen, Using an (English) View Edit Remove  Diabetic Ketoacidosis (English) View Edit Remove

## 2019-02-12 NOTE — PLAN OF CARE
Problem: Adult Inpatient Plan of Care  Goal: Plan of Care Review  Outcome: Ongoing (interventions implemented as appropriate)  A&Ox4, pt rested/slept majority of evening w/o complications and few disruptions.  Towards beginning of evening pt suffered from persistent cough which lead to development of a headache which was relieved by prn Tessalon Pearls x1 and prn Tylenol provided x1.   No n/v, no SOB.  Pt reports no new events or onset of new s/s.   Pt on room air, independent up ad liberty, accuchecks achs - no requirements met for insulin this shift.      Ref. Range 2/11/2019 21:04 2/11/2019 22:50   POCT Glucose Latest Ref Range: 70 - 110 mg/dL 75 93

## 2019-02-12 NOTE — PLAN OF CARE
Pt came back to her room and verbalized she decided to take a walk and watch the rain. Nurse educate pt to notify staff when getting out of bed or room for safety. Pt is not a fall risk. Pt verbalized understanding.

## 2019-02-12 NOTE — NURSING
Pt is a transfer from icu. Insulin drip still active on med list and no prn sliding scale insulin noted. Dr Hull notified. States she will take a look at it and fix it.

## 2019-02-12 NOTE — PLAN OF CARE
Patient's midday BMP with CO2 18, AG 10 and glucose 225. Stopped IVFs and started po diet with levemir 40U. Patient tolerated well without nausea, vomiting or abdominal pain. On 2h repeat BMP, CO2 19, AG 10 and glucose 237. Will stop insulin gtt and step patient down to floor. Orders in for insulin novolog 10U with meals starting this evening and levemir 40U qHS starting tomorrow. Labs changed to BMP qd and POCT glucose with meals and before bed.       Tamera Johnston  2/11/2019

## 2019-02-12 NOTE — PLAN OF CARE
Discharge paper given to pt at the bedside.VN will go over with discharge paper. IV removed and no bleeding noted. Telemetry removed.

## 2019-02-12 NOTE — PLAN OF CARE
Pt is not in room while nurse is getting report from off coming nurse. Pt is on tele. Notified OC and security.

## 2019-02-13 ENCOUNTER — PATIENT OUTREACH (OUTPATIENT)
Dept: ADMINISTRATIVE | Facility: CLINIC | Age: 24
End: 2019-02-13

## 2019-02-13 NOTE — PATIENT INSTRUCTIONS
Diabetic Ketoacidosis  Diabetic ketoacidosis (DKA) is a serious problem that can happen in people with diabetes. DKA should be treated as a medical emergency. This is because it can lead to coma or death. If you have the symptoms of DKA, get medical help right away.  DKA happens more often in people with type 1 diabetes. But it can happen in people with type 2 diabetes. It can also happen in women with diabetes during pregnancy. This is often known as gestational diabetes.  DKA happens when insulin levels are too low. Without enough insulin, sugar (glucose) cant get to the cells of your body. The glucose stays in the blood. This causes high blood glucose (hyperglycemia). Without glucose, your body breaks down stored fat for energy. When this happens, acids called ketones are released into the blood. This is known as ketosis. High levels of ketones (ketoacidosis) can be harmful to you. Hyperglycemia and ketoacidosis can also cause serious problems in the blood and your body, such as:  · Low levels of potassium (hypokalemia)  · Swelling inside the brain (cerebral edema)  · Fluid in the lungs (pulmonary edema)  · Damage to kidneys or other organs  What causes diabetic ketoacidosis?  In people with diabetes, DKA is most often caused by too little insulin in the body. It is also caused by:  · Poor management of diabetes  · Infections like a urinary tract infection or pneumonia  · Serious health problems, such as a heart attack  · Reactions to certain prescribed medicines and illicit drugs  Symptoms of diabetic ketoacidosis  DKA most often happens slowly over time. But it can worsen in a few hours if you are vomiting. The first symptoms are:  · Thirst and dry mouth  · Urinating a lot  · Belly pain  · Nausea or vomiting  · Breath that smells fruity (from the ketones)  Over time, these symptoms may happen:  · Dry or flushed skin  · Nausea and vomiting  · Loss of appetite  · Weight loss  · Belly pain  · Trouble  breathing  · Trouble thinking or confusion  · Feeling very tired or weak. This can lead to coma.  How is diabetic ketoacidosis diagnosed?  Your healthcare provider will ask about your medical history. He or she will give you a physical exam. You may also have these tests:  · Blood tests to check your glucose levels  · Blood tests to check your electrolytes, such as potassium and sodium  · Urine test to check for ketones  These tests are done to check for DKA, and monitor it over time.  How is diabetic ketoacidosis treated?  DKA needs treatment right away in the hospital. Treatment includes:  · Insulin. This is the main type of treatment. Insulin allows the cells to use the glucose in the blood. This lowers the levels of both blood glucose and ketones.  · Fluids and electrolytes. These are given through a vein (IV). Fluids are replaced and abnormal electrolyte levels are corrected.  · Other medicines. These may be given to treat an illness that caused DKA. For example, antibiotics may be given to treat a urinary tract infection that caused DKA.  Preventing diabetic ketoacidosis  To help prevent DKA, make sure you:  · Take all of your medicines for diabetes exactly as prescribed. This includes insulin.  · Check your blood glucose levels exactly as instructed.  · Be especially careful when you are sick with an illness or an infection. Take extra care to follow diabetes care instructions. Check your blood glucose more often.  · Do not exercise when your blood sugar is high and you have ketones in your urine.   · Check your urine ketone levels if told to do so. This is done with a urine test strip. Ask your healthcare provider how often to check your urine.     When to call your healthcare provider  Call your healthcare provider right away if you:  · Have symptoms of DKA  · Have very high blood glucose levels  · Are getting sick with another illness   Date Last Reviewed: 7/1/2016  © 0483-5216 [employer]  [address] [place] [address]. All rights reserved. This information is not intended as a substitute for professional medical care. Always follow your healthcare professional's instructions.

## 2019-02-15 LAB — BACTERIA BLD CULT: NORMAL

## 2019-04-16 ENCOUNTER — HOSPITAL ENCOUNTER (EMERGENCY)
Facility: HOSPITAL | Age: 24
Discharge: HOME | End: 2019-04-16
Attending: EMERGENCY MEDICINE
Payer: MEDICAID

## 2019-04-16 VITALS
RESPIRATION RATE: 16 BRPM | OXYGEN SATURATION: 98 % | TEMPERATURE: 99 F | WEIGHT: 107 LBS | DIASTOLIC BLOOD PRESSURE: 71 MMHG | HEIGHT: 60 IN | HEART RATE: 86 BPM | BODY MASS INDEX: 21.01 KG/M2 | SYSTOLIC BLOOD PRESSURE: 106 MMHG

## 2019-04-16 DIAGNOSIS — M79.674 TOE PAIN, RIGHT: Primary | ICD-10-CM

## 2019-04-16 LAB
B-HCG UR QL: NEGATIVE
CTP QC/QA: YES
GLUCOSE SERPL-MCNC: 54 MG/DL (ref 70–110)
POCT GLUCOSE: 120 MG/DL (ref 70–110)
POCT GLUCOSE: 50 MG/DL (ref 70–110)
POCT GLUCOSE: 54 MG/DL (ref 70–110)

## 2019-04-16 PROCEDURE — 25000003 PHARM REV CODE 250: Performed by: PHYSICIAN ASSISTANT

## 2019-04-16 PROCEDURE — 81025 URINE PREGNANCY TEST: CPT | Performed by: PHYSICIAN ASSISTANT

## 2019-04-16 PROCEDURE — 99283 EMERGENCY DEPT VISIT LOW MDM: CPT | Mod: 25

## 2019-04-16 PROCEDURE — 82962 GLUCOSE BLOOD TEST: CPT

## 2019-04-16 RX ORDER — ACETAMINOPHEN 500 MG
1000 TABLET ORAL
Status: COMPLETED | OUTPATIENT
Start: 2019-04-16 | End: 2019-04-16

## 2019-04-16 RX ADMIN — ACETAMINOPHEN 1000 MG: 500 TABLET ORAL at 08:04

## 2019-04-17 NOTE — ED PROVIDER NOTES
Encounter Date: 4/16/2019       History     Chief Complaint   Patient presents with    Toe Injury     right pinky toe injury yesterday secondary to hitting the bed frame.  Patient is concerned because she is diabetic.     23-year-old female presents the ED with complaints of right 5th toe pain x1 day.  Patient states she hit her toe on her dresser yesterday and has had pain since.  Also admits to mild swelling and bruising.  Denies lacerations or abrasions.  She has not taken anything for the pain.    The history is provided by the patient. No  was used.     Review of patient's allergies indicates:   Allergen Reactions    Ceclor [cefaclor] Hives     Past Medical History:   Diagnosis Date    Asthma     Diabetes mellitus      Past Surgical History:   Procedure Laterality Date    TYMPANOSTOMY TUBE PLACEMENT       Family History   Problem Relation Age of Onset    Diabetes Paternal Grandfather     Hypertension Paternal Grandfather      Social History     Tobacco Use    Smoking status: Never Smoker   Substance Use Topics    Alcohol use: Yes     Comment: socially    Drug use: No     Review of Systems   Musculoskeletal: Positive for myalgias.   Skin: Positive for color change. Negative for wound.   All other systems reviewed and are negative.      Physical Exam     Initial Vitals [04/16/19 1848]   BP Pulse Resp Temp SpO2   119/71 95 16 98.4 °F (36.9 °C) 98 %      MAP       --         Physical Exam    Nursing note and vitals reviewed.  Constitutional: Vital signs are normal. She appears well-developed and well-nourished. No distress.   HENT:   Head: Normocephalic and atraumatic.   Nose: Nose normal.   Eyes: Conjunctivae and lids are normal.   Neck: Normal range of motion and phonation normal.   Cardiovascular: Normal rate.   Pulmonary/Chest: Effort normal.   Abdominal: Normal appearance.   Musculoskeletal:        Right foot: There is decreased range of motion, tenderness (To right 5th toe) and  swelling. There is normal capillary refill, no crepitus, no deformity and no laceration.        Feet:    Bruising noted to right 5th toe.  No decreased sensation.  No 5th metacarpal tenderness   Neurological: She is alert and oriented to person, place, and time.   Skin: Skin is warm and dry.   Psychiatric: She has a normal mood and affect. Her speech is normal and behavior is normal. Judgment and thought content normal. Cognition and memory are normal.         ED Course   Procedures  Labs Reviewed   POCT GLUCOSE - Abnormal; Notable for the following components:       Result Value    POCT Glucose 54 (*)     All other components within normal limits   POCT GLUCOSE - Abnormal; Notable for the following components:    POCT Glucose 50 (*)     All other components within normal limits   POCT URINE PREGNANCY          Imaging Results           X-Ray Toe 2 or More Views Right (Final result)  Result time 04/16/19 20:09:55    Final result by Ashley Banuelos MD (04/16/19 20:09:55)                 Impression:      Bony resorption of the head of the middle phalanx of the 5th digit.  Etiologies may be infectious ie osteomyelitis, metabolic ie hyperparathyroidism, and less likely neoplastic.  Recommend clinical correlation.  If warranted, consider MRI.    This report was flagged in Epic as abnormal.      Electronically signed by: Ashley Banuelos  Date:    04/16/2019  Time:    20:09             Narrative:    EXAMINATION:  TWO VIEWS OF THE RIGHT TOES    CLINICAL HISTORY:  right 5th toe pain;    TECHNIQUE:  AP, lateral and oblique views of the right toes.    COMPARISON:  None.    FINDINGS:  The three views of the right toes demonstrates bony resorption of the head of the middle phalanx of the 5th digit.  There is no acute fracture or dislocation.                                 Medical Decision Making:   Initial Assessment:   23-year-old female with right 5th toe pain after hitting it on her dresser yesterday.  Of note patient's  blood glucose was in the 50s when she arrived in the ED.  She was given a tray of food and glucose improved to 120.  Differential Diagnosis:   Fracture, contusion, dislocation,  Clinical Tests:   Radiological Study: Ordered and Reviewed  ED Management:  Patient given Tylenol in ED.  X-ray shows Bony resorption of the head of the middle phalanx of the 5th digit.  Etiologies may be infectious ie osteomyelitis, metabolic ie hyperparathyroidism, and less likely neoplastic.  I do not suspect these etiologies as the patient has a history of recent trauma to the 5th digit.  I discussed these results with her and instructed her to alternate Tylenol and Motrin every 3 hr, ice digit, and follow up with her PCP in 1 week for recheck and possible reimaging.  Rid return precautions given.  Patient states understanding and agreement treatment plan              Attending Attestation:     Physician Attestation Statement for NP/PA:   I discussed this assessment and plan of this patient with the NP/PA, but I did not personally examine the patient. The face to face encounter was performed by the NP/PA.                     Clinical Impression:       ICD-10-CM ICD-9-CM   1. Toe pain, right M79.674 729.5                                Snehal Mata PA-C  04/16/19 2045       Riccardo Brunson MD  04/16/19 2048

## 2019-04-17 NOTE — DISCHARGE INSTRUCTIONS
Alternate tylenol and motrin every 3 hours and ice toe. Follow up with your PCP in 1 week for recheck and possible x-ray.

## 2019-04-17 NOTE — ED NOTES
Pt presents to the ED with c/o right 5th toe pain. Pt states she stubbed her toe on her bed last night and has had increasing pain since. Pt ambulatory to room. Swelling noted to toe, no skin break noted. Pt denies numbness or tingling to toe.

## 2019-09-06 ENCOUNTER — HOSPITAL ENCOUNTER (EMERGENCY)
Facility: HOSPITAL | Age: 24
Discharge: HOME | End: 2019-09-06
Attending: EMERGENCY MEDICINE
Payer: MEDICAID

## 2019-09-06 VITALS
HEART RATE: 90 BPM | WEIGHT: 110 LBS | BODY MASS INDEX: 21.6 KG/M2 | TEMPERATURE: 98 F | DIASTOLIC BLOOD PRESSURE: 77 MMHG | RESPIRATION RATE: 16 BRPM | OXYGEN SATURATION: 99 % | HEIGHT: 60 IN | SYSTOLIC BLOOD PRESSURE: 124 MMHG

## 2019-09-06 DIAGNOSIS — A59.00 UROGENITAL INFECTION BY TRICHOMONAS VAGINALIS: Primary | ICD-10-CM

## 2019-09-06 LAB
B-HCG UR QL: NEGATIVE
BACTERIA #/AREA URNS HPF: ABNORMAL /HPF
BACTERIA GENITAL QL WET PREP: ABNORMAL
BILIRUB UR QL STRIP: NEGATIVE
CLARITY UR: CLEAR
CLUE CELLS VAG QL WET PREP: ABNORMAL
COLOR UR: YELLOW
CTP QC/QA: YES
FILAMENT FUNGI VAG WET PREP-#/AREA: ABNORMAL
GLUCOSE UR QL STRIP: NEGATIVE
HGB UR QL STRIP: ABNORMAL
HYALINE CASTS #/AREA URNS LPF: 1 /LPF
KETONES UR QL STRIP: NEGATIVE
LEUKOCYTE ESTERASE UR QL STRIP: NEGATIVE
MICROSCOPIC COMMENT: ABNORMAL
NITRITE UR QL STRIP: NEGATIVE
PH UR STRIP: 8 [PH] (ref 5–8)
POCT GLUCOSE: 237 MG/DL (ref 70–110)
PROT UR QL STRIP: ABNORMAL
RBC #/AREA URNS HPF: 50 /HPF (ref 0–4)
SP GR UR STRIP: 1.02 (ref 1–1.03)
SPECIMEN SOURCE: ABNORMAL
T VAGINALIS GENITAL QL WET PREP: ABNORMAL
URN SPEC COLLECT METH UR: ABNORMAL
UROBILINOGEN UR STRIP-ACNC: NEGATIVE EU/DL
WBC #/AREA URNS HPF: 5 /HPF (ref 0–5)
WBC #/AREA VAG WET PREP: ABNORMAL
YEAST GENITAL QL WET PREP: ABNORMAL

## 2019-09-06 PROCEDURE — 81000 URINALYSIS NONAUTO W/SCOPE: CPT

## 2019-09-06 PROCEDURE — 87210 SMEAR WET MOUNT SALINE/INK: CPT

## 2019-09-06 PROCEDURE — 87491 CHLMYD TRACH DNA AMP PROBE: CPT

## 2019-09-06 PROCEDURE — 25000003 PHARM REV CODE 250: Performed by: PHYSICIAN ASSISTANT

## 2019-09-06 PROCEDURE — 81025 URINE PREGNANCY TEST: CPT | Performed by: EMERGENCY MEDICINE

## 2019-09-06 PROCEDURE — 82962 GLUCOSE BLOOD TEST: CPT

## 2019-09-06 PROCEDURE — 99284 EMERGENCY DEPT VISIT MOD MDM: CPT | Mod: 25

## 2019-09-06 RX ORDER — ONDANSETRON 4 MG/1
4 TABLET, ORALLY DISINTEGRATING ORAL
Status: COMPLETED | OUTPATIENT
Start: 2019-09-06 | End: 2019-09-06

## 2019-09-06 RX ORDER — ONDANSETRON 4 MG/1
4 TABLET, ORALLY DISINTEGRATING ORAL EVERY 8 HOURS PRN
Qty: 30 TABLET | Refills: 0 | Status: SHIPPED | OUTPATIENT
Start: 2019-09-06 | End: 2023-05-03

## 2019-09-06 RX ORDER — METRONIDAZOLE 500 MG/1
2000 TABLET ORAL
Status: COMPLETED | OUTPATIENT
Start: 2019-09-06 | End: 2019-09-06

## 2019-09-06 RX ORDER — AZITHROMYCIN 500 MG/1
2000 TABLET, FILM COATED ORAL ONCE
Qty: 4 TABLET | Refills: 0 | Status: SHIPPED | OUTPATIENT
Start: 2019-09-06 | End: 2019-09-06 | Stop reason: SDUPTHER

## 2019-09-06 RX ORDER — AZITHROMYCIN 500 MG/1
2000 TABLET, FILM COATED ORAL ONCE
Qty: 4 TABLET | Refills: 0 | Status: SHIPPED | OUTPATIENT
Start: 2019-09-06 | End: 2019-09-06

## 2019-09-06 RX ADMIN — METRONIDAZOLE 2000 MG: 500 TABLET, FILM COATED ORAL at 10:09

## 2019-09-06 RX ADMIN — ONDANSETRON 4 MG: 4 TABLET, ORALLY DISINTEGRATING ORAL at 10:09

## 2019-09-07 NOTE — ED PROVIDER NOTES
Encounter Date: 9/6/2019       History     Chief Complaint   Patient presents with    Dysuria     Patient presents to the ED with reports of having dysuria with frequency that started x 2 days ago. No treatment attempted prior to arrival.      Karie Perez 23 y.o. Afebrile female with PMH of asthma and DM presented to the ED with c/o  complaint. She states for the past two days she has had urinary urgency and dysuria. She describes some associated suprapubic pressure and that symptoms are more severe at the end of her stream. She does state the she noted scant dark brown vaginal discharge today. She denies any concern for STI however states she had trichomoniasis in the past. She denies any fever, chills, N/V/D, hematuria, flank pain or rash. She has not tried any medications for the symptoms. She does state BG has been in good control up until tonight which she attributes to eating just PTA.       The history is provided by the patient.     Review of patient's allergies indicates:   Allergen Reactions    Ceclor [cefaclor] Hives     Past Medical History:   Diagnosis Date    Asthma     Diabetes mellitus      Past Surgical History:   Procedure Laterality Date    TYMPANOSTOMY TUBE PLACEMENT       Family History   Problem Relation Age of Onset    Diabetes Paternal Grandfather     Hypertension Paternal Grandfather      Social History     Tobacco Use    Smoking status: Never Smoker   Substance Use Topics    Alcohol use: Yes     Comment: socially    Drug use: No     Review of Systems   Constitutional: Negative for chills and fever.   Respiratory: Negative for shortness of breath.    Cardiovascular: Negative for chest pain.   Gastrointestinal: Positive for abdominal pain (suprapubic pressure). Negative for constipation, diarrhea, nausea and vomiting.   Endocrine: Negative for polydipsia and polyuria.   Genitourinary: Positive for dysuria, frequency, urgency and vaginal discharge. Negative for difficulty  urinating, flank pain, genital sores, hematuria, menstrual problem, vaginal bleeding and vaginal pain.   Musculoskeletal: Negative for arthralgias and back pain.   Skin: Negative for rash.   Neurological: Negative for dizziness, weakness and headaches.   Hematological: Does not bruise/bleed easily.       Physical Exam     Initial Vitals [09/06/19 2042]   BP Pulse Resp Temp SpO2   120/75 106 18 97.7 °F (36.5 °C) 100 %      MAP       --         Physical Exam    Nursing note and vitals reviewed.  Constitutional: Vital signs are normal. She appears well-developed and well-nourished. She is cooperative.  Non-toxic appearance. She does not appear ill. No distress.   HENT:   Head: Normocephalic and atraumatic.   Eyes: Conjunctivae and lids are normal.   Neck: Neck supple. No neck rigidity.   Cardiovascular: Normal rate and regular rhythm.   Pulmonary/Chest: Breath sounds normal. No respiratory distress. She has no wheezes. She has no rhonchi.   Abdominal: Soft. Normal appearance and bowel sounds are normal. She exhibits no distension, no pulsatile midline mass and no mass. There is no tenderness. There is no rigidity, no rebound and no guarding.   Musculoskeletal: Normal range of motion.   Neurological: She is alert and oriented to person, place, and time. GCS eye subscore is 4. GCS verbal subscore is 5. GCS motor subscore is 6.   Skin: Skin is warm, dry and intact. No rash noted.   Psychiatric: She has a normal mood and affect. Her speech is normal and behavior is normal. Thought content normal.         ED Course   Procedures  Labs Reviewed   URINALYSIS, REFLEX TO URINE CULTURE - Abnormal; Notable for the following components:       Result Value    Protein, UA 2+ (*)     Occult Blood UA 3+ (*)     All other components within normal limits    Narrative:     Preferred Collection Type->Urine, Clean Catch   URINALYSIS MICROSCOPIC - Abnormal; Notable for the following components:    RBC, UA 50 (*)     Bacteria Few (*)     All  other components within normal limits    Narrative:     Preferred Collection Type->Urine, Clean Catch   POCT URINE PREGNANCY - Normal   C. TRACHOMATIS/N. GONORRHOEAE BY AMP DNA   C. TRACHOMATIS/N. GONORRHOEAE BY AMP DNA   POCT GLUCOSE MONITORING CONTINUOUS          Imaging Results    None          Medical Decision Making:   Initial Assessment:   23 y.o. Female with DM presents to ED for evaluation of  symptoms. She appears well and in no distress. Physical exam grossly unremarkable was no abdominal TTP, guarding or rigidity. Patient did complete self vaginal swab. Skin free of rahs and pallor.   Differential Diagnosis:   Differential Diagnosis includes, but is not limited to:  STI, HSV, BV, PID, TOA, vaginal foreign body, vaginal trauma, ovarian torsion, ovarian cyst, UTI/pyelonephritis, pregnancy complication, dysmenorrhea, mittelschmertz, appendicitis, nephrolithiasis, appendicitis, colitis, diverticulitis,    Clinical Tests:   Lab Tests: Ordered and Reviewed  ED Management:  Patient with known DM with elevated BG reports she ate just PTA. NO clinical exam findings or symptomology to suggest DKA at this time.  Abdomen continues with no TTP. UA shows bacteria however likely from vaginal source as she reports some dark brow vaginal discharge over the past two days. Will await and ABX for AU source pending culture.  Vaginal screen positive for trichomonas. Patient states that she has had this in the past. We will treat with flagyl here. Patient denies any other risk of STI however would like empiric treatment. Given her sever cephalosporin allergy we elected to send home with 2 g of Zithromax per UTD recommendations. Low suspicion of PID at this time as no fever, distress ro TTP on exam. ROS positive for  complaint.  Physical exam reveals patient well appearing and in no obvious distress. She was counseled extensively on sexual health, including the need to f/u with PCP or other formal STI clinic for further  evaluation/management and test of cure. The patient was instructed to refrain from further sexual activity until successfully tested and treated, and to inform any/all partners of their need for testing and treatment. Patient stated understanding. Strict instructions to follow up with primary care physician or reference provided for further assessment and evaluation. Given instructions to return for any acute symptoms and verbalized understanding of this medical plan.                              Clinical Impression:       ICD-10-CM ICD-9-CM   1. Urogenital infection by trichomonas vaginalis A59.00 131.00                                    DAR Rothman  09/07/19 1424

## 2019-09-09 LAB
C TRACH DNA SPEC QL NAA+PROBE: NOT DETECTED
N GONORRHOEA DNA SPEC QL NAA+PROBE: NOT DETECTED

## 2019-09-10 ENCOUNTER — HOSPITAL ENCOUNTER (EMERGENCY)
Facility: HOSPITAL | Age: 24
Discharge: HOME | End: 2019-09-10
Attending: EMERGENCY MEDICINE
Payer: MEDICAID

## 2019-09-10 VITALS
OXYGEN SATURATION: 99 % | WEIGHT: 110 LBS | RESPIRATION RATE: 18 BRPM | TEMPERATURE: 99 F | HEART RATE: 101 BPM | DIASTOLIC BLOOD PRESSURE: 78 MMHG | SYSTOLIC BLOOD PRESSURE: 120 MMHG | HEIGHT: 60 IN | BODY MASS INDEX: 21.6 KG/M2

## 2019-09-10 DIAGNOSIS — N30.01 ACUTE CYSTITIS WITH HEMATURIA: Primary | ICD-10-CM

## 2019-09-10 LAB
B-HCG UR QL: NEGATIVE
BACTERIA #/AREA URNS HPF: ABNORMAL /HPF
BILIRUB UR QL STRIP: NEGATIVE
CLARITY UR: ABNORMAL
COLOR UR: YELLOW
CTP QC/QA: YES
GLUCOSE UR QL STRIP: NEGATIVE
HGB UR QL STRIP: ABNORMAL
KETONES UR QL STRIP: NEGATIVE
LEUKOCYTE ESTERASE UR QL STRIP: ABNORMAL
MICROSCOPIC COMMENT: ABNORMAL
NITRITE UR QL STRIP: NEGATIVE
PH UR STRIP: 7 [PH] (ref 5–8)
POCT GLUCOSE: 158 MG/DL (ref 70–110)
PROT UR QL STRIP: ABNORMAL
RBC #/AREA URNS HPF: 5 /HPF (ref 0–4)
SP GR UR STRIP: 1.01 (ref 1–1.03)
URN SPEC COLLECT METH UR: ABNORMAL
UROBILINOGEN UR STRIP-ACNC: NEGATIVE EU/DL
WBC #/AREA URNS HPF: >100 /HPF (ref 0–5)

## 2019-09-10 PROCEDURE — 87186 SC STD MICRODIL/AGAR DIL: CPT

## 2019-09-10 PROCEDURE — 25000003 PHARM REV CODE 250: Performed by: PHYSICIAN ASSISTANT

## 2019-09-10 PROCEDURE — 87088 URINE BACTERIA CULTURE: CPT

## 2019-09-10 PROCEDURE — 81025 URINE PREGNANCY TEST: CPT | Performed by: EMERGENCY MEDICINE

## 2019-09-10 PROCEDURE — 87077 CULTURE AEROBIC IDENTIFY: CPT

## 2019-09-10 PROCEDURE — 81000 URINALYSIS NONAUTO W/SCOPE: CPT

## 2019-09-10 PROCEDURE — 99283 EMERGENCY DEPT VISIT LOW MDM: CPT | Mod: 25

## 2019-09-10 PROCEDURE — 87086 URINE CULTURE/COLONY COUNT: CPT

## 2019-09-10 PROCEDURE — 82962 GLUCOSE BLOOD TEST: CPT

## 2019-09-10 RX ORDER — FLUCONAZOLE 150 MG/1
150 TABLET ORAL ONCE
Qty: 1 TABLET | Refills: 0 | Status: SHIPPED | OUTPATIENT
Start: 2019-09-10 | End: 2019-09-10

## 2019-09-10 RX ORDER — NITROFURANTOIN 25; 75 MG/1; MG/1
100 CAPSULE ORAL EVERY 12 HOURS
Qty: 10 CAPSULE | Refills: 0 | Status: SHIPPED | OUTPATIENT
Start: 2019-09-10 | End: 2019-09-15

## 2019-09-10 RX ORDER — SULFAMETHOXAZOLE AND TRIMETHOPRIM 800; 160 MG/1; MG/1
1 TABLET ORAL
Status: DISCONTINUED | OUTPATIENT
Start: 2019-09-10 | End: 2019-09-10

## 2019-09-10 RX ORDER — SULFAMETHOXAZOLE AND TRIMETHOPRIM 800; 160 MG/1; MG/1
1 TABLET ORAL 2 TIMES DAILY
Qty: 6 TABLET | Refills: 0 | Status: SHIPPED | OUTPATIENT
Start: 2019-09-10 | End: 2019-09-10 | Stop reason: ALTCHOICE

## 2019-09-10 RX ORDER — NITROFURANTOIN 25; 75 MG/1; MG/1
100 CAPSULE ORAL
Status: COMPLETED | OUTPATIENT
Start: 2019-09-10 | End: 2019-09-10

## 2019-09-10 RX ORDER — PHENAZOPYRIDINE HYDROCHLORIDE 100 MG/1
200 TABLET, FILM COATED ORAL
Status: COMPLETED | OUTPATIENT
Start: 2019-09-10 | End: 2019-09-10

## 2019-09-10 RX ADMIN — NITROFURANTOIN MONOHYDRATE/MACROCRYSTALLINE 100 MG: 25; 75 CAPSULE ORAL at 11:09

## 2019-09-10 RX ADMIN — PHENAZOPYRIDINE HYDROCHLORIDE 200 MG: 100 TABLET ORAL at 11:09

## 2019-09-10 NOTE — ED PROVIDER NOTES
Encounter Date: 9/10/2019       History     Chief Complaint   Patient presents with    Dysuria     Seen here over the weekend with c/o dysuria - dx with trichomonas. States continues with dysuria, pressure when urinating. No fever reported.      24 y/o female with history of DM type 1 presents to the ER with chief complaint of suprapubic pressure after urinating, lasting for 2 minutes , x 1 week.  She has no current abdominal pain.  She reports increased urinary urgency.  She denies vaginal discharge, back or flank pain, nausea, vomiting, fever or chills.  Patient was in the ER 4 days ago with complaint of urinary urgency and dysuria with suprapubic pressure.  Patient was diagnosed with trichomonas and treated with flagyl 2 g in the ER.  She says her pain has improved since last ER visit, but has not resolved.  Her pain is rated 5/10 when present.  She is not taking any medications for her symptoms.  She denies additional complaints at this time.            Review of patient's allergies indicates:   Allergen Reactions    Ceclor [cefaclor] Hives     Past Medical History:   Diagnosis Date    Asthma     Diabetes mellitus      Past Surgical History:   Procedure Laterality Date    TYMPANOSTOMY TUBE PLACEMENT       Family History   Problem Relation Age of Onset    Diabetes Paternal Grandfather     Hypertension Paternal Grandfather      Social History     Tobacco Use    Smoking status: Never Smoker   Substance Use Topics    Alcohol use: Yes     Comment: socially    Drug use: No     Review of Systems   Constitutional: Negative for chills and fever.   HENT: Negative for sore throat.    Respiratory: Negative for shortness of breath.    Cardiovascular: Negative for chest pain.   Gastrointestinal: Positive for abdominal pain (suprapubic pressure with urination). Negative for nausea and vomiting.   Genitourinary: Positive for dysuria and urgency. Negative for difficulty urinating, pelvic pain, vaginal bleeding and  vaginal discharge.   Musculoskeletal: Negative for back pain.   Skin: Negative for rash.   Neurological: Negative for dizziness, syncope, weakness and light-headedness.   Hematological: Does not bruise/bleed easily.       Physical Exam     Initial Vitals [09/10/19 1010]   BP Pulse Resp Temp SpO2   119/76 100 16 98.6 °F (37 °C) 99 %      MAP       --         Physical Exam    Nursing note and vitals reviewed.  Constitutional: She appears well-developed and well-nourished.   HENT:   Head: Atraumatic.   Mouth/Throat: Oropharynx is clear and moist.   Eyes: Conjunctivae and EOM are normal. Pupils are equal, round, and reactive to light.   Neck: Normal range of motion. Neck supple.   Cardiovascular: Normal rate and regular rhythm.   Pulmonary/Chest: Breath sounds normal. No respiratory distress. She has no wheezes. She has no rhonchi. She has no rales.   Abdominal: Soft. Bowel sounds are normal. She exhibits no distension. There is no tenderness. There is no rigidity, no rebound, no guarding and no CVA tenderness.   Neurological: She is alert and oriented to person, place, and time.   Skin: No rash noted.   Psychiatric: She has a normal mood and affect.         ED Course   Procedures  Labs Reviewed   URINALYSIS, REFLEX TO URINE CULTURE - Abnormal; Notable for the following components:       Result Value    Appearance, UA Hazy (*)     Protein, UA Trace (*)     Occult Blood UA 2+ (*)     Leukocytes, UA 3+ (*)     All other components within normal limits    Narrative:     Preferred Collection Type->Urine, Clean Catch   URINALYSIS MICROSCOPIC - Abnormal; Notable for the following components:    RBC, UA 5 (*)     WBC, UA >100 (*)     Bacteria Few (*)     All other components within normal limits    Narrative:     Preferred Collection Type->Urine, Clean Catch   CULTURE, URINE   POCT URINE PREGNANCY   POCT GLUCOSE MONITORING CONTINUOUS          Imaging Results    None                APC / Resident Notes:   Patient presents to  the ER with chief complaint of suprapubic pressure, which occurs immediately after urinating and last for 2 min.  She also knows urinary urgency.  Patient notes that symptoms improve mildly since she was treated for Trichomonas 4 days ago.  Patient was unable to fill azithromycin prescription due to cost.  GC and chlamydia cultures are negative, so she will not require this medication.  Patient did not have significant amount of bacteria in UA on 09/10.  UA today shows >100 WBC and + leukocytes.  She does not have flank pain, vomiting or fever to suggest pyelonephritis.    She has no abdominal tenderness on exam and I do not suspect appendicitis, diverticulitis, acute cholecystitis.  Presentation and exam and not concerning for PID, TOA or ovarian torsion.   She is taking her insulin as prescribed.  Glucose today has ranged from .  Will treat UTI with Macrobid.  She is given 1st dose of Macrobid and Pyridium in the ED.  I provided her with good Rx coupon for prescription and she says she will be able to fill the medication.  Urine culture is pending.  Will contact patient as needed.  Patient is given ER return precautions and advised to follow up with a primary care physician within 1 week for ER follow-up exam.  I discussed the care this patient my supervising physician.         Attending Attestation:     Physician Attestation Statement for NP/PA:   I reviewed the chart but I did not personally examine the patient. The face to face encounter was performed by the NP/PA.                     Clinical Impression:       ICD-10-CM ICD-9-CM   1. Acute cystitis with hematuria N30.01 595.0                                DAR Meyer  09/10/19 1143       Samira Burrows MD  09/11/19 9108

## 2019-09-10 NOTE — ED TRIAGE NOTES
Pt presents to ED complaining of dysuria. Pt states she was seen this weekend and diagnosed with trichomonas- pt was medicated in ED, but was unable to fill prescription of azithromycin. Pt states she still feels pressure on urination, hesitancy, and has vaginal itching. Pt has no other complaints at this time

## 2019-09-12 LAB — BACTERIA UR CULT: ABNORMAL

## 2020-06-09 ENCOUNTER — HOSPITAL ENCOUNTER (EMERGENCY)
Facility: HOSPITAL | Age: 25
Discharge: HOME | End: 2020-06-09
Attending: EMERGENCY MEDICINE
Payer: MEDICAID

## 2020-06-09 VITALS
WEIGHT: 120 LBS | DIASTOLIC BLOOD PRESSURE: 77 MMHG | TEMPERATURE: 98 F | BODY MASS INDEX: 23.56 KG/M2 | HEART RATE: 101 BPM | HEIGHT: 60 IN | RESPIRATION RATE: 18 BRPM | SYSTOLIC BLOOD PRESSURE: 115 MMHG | OXYGEN SATURATION: 98 %

## 2020-06-09 DIAGNOSIS — S61.210A LACERATION OF RIGHT INDEX FINGER WITHOUT FOREIGN BODY WITHOUT DAMAGE TO NAIL, INITIAL ENCOUNTER: Primary | ICD-10-CM

## 2020-06-09 PROCEDURE — 90715 TDAP VACCINE 7 YRS/> IM: CPT | Mod: SL,ER | Performed by: PHYSICIAN ASSISTANT

## 2020-06-09 PROCEDURE — 25000003 PHARM REV CODE 250: Mod: ER | Performed by: PHYSICIAN ASSISTANT

## 2020-06-09 PROCEDURE — 12001 RPR S/N/AX/GEN/TRNK 2.5CM/<: CPT | Mod: F6,ER

## 2020-06-09 PROCEDURE — 99284 EMERGENCY DEPT VISIT MOD MDM: CPT | Mod: 25,ER

## 2020-06-09 PROCEDURE — 90471 IMMUNIZATION ADMIN: CPT | Mod: VFC,ER | Performed by: PHYSICIAN ASSISTANT

## 2020-06-09 PROCEDURE — 63600175 PHARM REV CODE 636 W HCPCS: Mod: SL,ER | Performed by: PHYSICIAN ASSISTANT

## 2020-06-09 RX ORDER — ACETAMINOPHEN 325 MG/1
650 TABLET ORAL
Status: COMPLETED | OUTPATIENT
Start: 2020-06-09 | End: 2020-06-09

## 2020-06-09 RX ADMIN — CLOSTRIDIUM TETANI TOXOID ANTIGEN (FORMALDEHYDE INACTIVATED), CORYNEBACTERIUM DIPHTHERIAE TOXOID ANTIGEN (FORMALDEHYDE INACTIVATED), BORDETELLA PERTUSSIS TOXOID ANTIGEN (GLUTARALDEHYDE INACTIVATED), BORDETELLA PERTUSSIS FILAMENTOUS HEMAGGLUTININ ANTIGEN (FORMALDEHYDE INACTIVATED), BORDETELLA PERTUSSIS PERTACTIN ANTIGEN, AND BORDETELLA PERTUSSIS FIMBRIAE 2/3 ANTIGEN 0.5 ML: 5; 2; 2.5; 5; 3; 5 INJECTION, SUSPENSION INTRAMUSCULAR at 02:06

## 2020-06-09 RX ADMIN — ACETAMINOPHEN 650 MG: 325 TABLET ORAL at 02:06

## 2020-06-09 NOTE — ED PROVIDER NOTES
Encounter Date: 2020    SCRIBE #1 NOTE: I, Samuel Lucio, am scribing for, and in the presence of,  DAR Heart. I have scribed the following portions of the note - Other sections scribed: HPI, ROS, PE.       History     Chief Complaint   Patient presents with    Laceration     Pt presents with small lac to R index finger. no bleeding noted. hx of type I diabetes. CBG reads 103 on personal device.      24 year old female with a laceration to the tip of her right finger prior to arrival. Patient was removing a razor blade out of a pack which cut her finger upon removal. Bleeding controlled. Denies any numbness or weakness. Tetanus not UTD    The history is provided by the patient. No  was used.     Review of patient's allergies indicates:   Allergen Reactions    Ceclor [cefaclor] Hives     Past Medical History:   Diagnosis Date    Asthma     Diabetes mellitus      Past Surgical History:   Procedure Laterality Date     SECTION      TYMPANOSTOMY TUBE PLACEMENT       Family History   Problem Relation Age of Onset    Diabetes Paternal Grandfather     Hypertension Paternal Grandfather      Social History     Tobacco Use    Smoking status: Never Smoker   Substance Use Topics    Alcohol use: Yes     Comment: socially    Drug use: No     Review of Systems   Skin: Positive for wound.   Neurological: Negative for weakness and numbness.   All other systems reviewed and are negative.      Physical Exam     Initial Vitals [20 1427]   BP Pulse Resp Temp SpO2   115/77 101 18 98.4 °F (36.9 °C) 98 %      MAP       --         Physical Exam    Nursing note and vitals reviewed.  Constitutional: She appears well-developed and well-nourished. She is not diaphoretic. No distress.   HENT:   Head: Normocephalic and atraumatic.   Right Ear: External ear normal.   Left Ear: External ear normal.   Nose: Nose normal.   Eyes: Conjunctivae and EOM are normal. Pupils are equal, round, and reactive  to light. No scleral icterus.   Neck: Normal range of motion. Neck supple.   Cardiovascular: Normal rate and regular rhythm.   Pulmonary/Chest: No respiratory distress.   Musculoskeletal: Normal range of motion.   Neurological: She is alert and oriented to person, place, and time. GCS score is 15. GCS eye subscore is 4. GCS verbal subscore is 5. GCS motor subscore is 6.   Skin: Skin is warm and dry. No rash noted. No erythema.   5 mm laceration to right index finger tip. Nail intact.         ED Course   Lac Repair  Date/Time: 6/9/2020 2:37 PM  Performed by: Nicola Lloyd PA-C  Authorized by: Halima Bradford MD   Body area: upper extremity  Location details: right index finger  Laceration length: 0.5 cm  Foreign bodies: no foreign bodies  Tendon involvement: none  Nerve involvement: none  Vascular damage: no  Patient sedated: no  Irrigation solution: tap water  Irrigation method: tap  Amount of cleaning: standard  Debridement: none  Degree of undermining: none  Skin closure: glue and Steri-Strips  Approximation: close  Approximation difficulty: simple  Dressing: splint for protection  Patient tolerance: Patient tolerated the procedure well with no immediate complications        Labs Reviewed - No data to display       Imaging Results    None          Medical Decision Making:   History:   Old Medical Records: I decided to obtain old medical records.  ED Management:  24-year-old female with minor laceration to right index finger from razor blade.  Bleeding controlled.  The fingers neurovascularly intact.  No evidence of foreign body, fracture, or infection.  Tetanus status updated.  Wound was cleaned and repaired with glue and Steri-Strips.  Finger splint for protection.  Discharged with wound care instructions.            Scribe Attestation:   Scribe #1: I performed the above scribed service and the documentation accurately describes the services I performed. I attest to the accuracy of the note.    Nicola  fallon                      Clinical Impression:     1. Laceration of right index finger without foreign body without damage to nail, initial encounter                ED Disposition Condition    Discharge Stable        ED Prescriptions     None        Follow-up Information     Follow up With Specialties Details Why Contact Info    Alexa Argueta MD Pediatrics Schedule an appointment as soon as possible for a visit  As needed, For re-evaluation [address] [facility]  [address]LO[address]  [facility] [address]  [phone]      [facility]    [address] [facility] [address]  [phone]                                       Nicola Lloyd, PA-C  06/09/20 1446

## 2022-01-04 ENCOUNTER — LAB VISIT (OUTPATIENT)
Dept: PRIMARY CARE CLINIC | Facility: OTHER | Age: 27
End: 2022-01-04
Attending: INTERNAL MEDICINE
Payer: MEDICAID

## 2022-01-04 ENCOUNTER — PATIENT MESSAGE (OUTPATIENT)
Dept: ADMINISTRATIVE | Facility: OTHER | Age: 27
End: 2022-01-04
Payer: MEDICAID

## 2022-01-04 DIAGNOSIS — R05.9 COUGH: ICD-10-CM

## 2022-01-04 PROCEDURE — U0003 INFECTIOUS AGENT DETECTION BY NUCLEIC ACID (DNA OR RNA); SEVERE ACUTE RESPIRATORY SYNDROME CORONAVIRUS 2 (SARS-COV-2) (CORONAVIRUS DISEASE [COVID-19]), AMPLIFIED PROBE TECHNIQUE, MAKING USE OF HIGH THROUGHPUT TECHNOLOGIES AS DESCRIBED BY CMS-2020-01-R: HCPCS | Performed by: INTERNAL MEDICINE

## 2022-01-08 DIAGNOSIS — U07.1 COVID-19 VIRUS DETECTED: ICD-10-CM

## 2022-01-08 LAB
SARS-COV-2 RNA RESP QL NAA+PROBE: DETECTED
SARS-COV-2- CYCLE NUMBER: 36

## 2023-05-03 ENCOUNTER — PATIENT MESSAGE (OUTPATIENT)
Dept: PRIMARY CARE CLINIC | Facility: CLINIC | Age: 28
End: 2023-05-03

## 2023-05-03 ENCOUNTER — OFFICE VISIT (OUTPATIENT)
Dept: PRIMARY CARE CLINIC | Facility: CLINIC | Age: 28
End: 2023-05-03
Payer: MEDICAID

## 2023-05-03 VITALS
WEIGHT: 149.25 LBS | HEART RATE: 90 BPM | SYSTOLIC BLOOD PRESSURE: 129 MMHG | OXYGEN SATURATION: 99 % | DIASTOLIC BLOOD PRESSURE: 67 MMHG | BODY MASS INDEX: 29.3 KG/M2 | HEIGHT: 60 IN

## 2023-05-03 DIAGNOSIS — F33.1 MODERATE EPISODE OF RECURRENT MAJOR DEPRESSIVE DISORDER: ICD-10-CM

## 2023-05-03 DIAGNOSIS — Z00.00 WELL ADULT EXAM: Primary | ICD-10-CM

## 2023-05-03 DIAGNOSIS — J45.20 ASTHMA, MILD INTERMITTENT, WELL-CONTROLLED: ICD-10-CM

## 2023-05-03 DIAGNOSIS — E10.3299 TYPE 1 DIABETES MELLITUS WITH BACKGROUND DIABETIC RETINOPATHY: ICD-10-CM

## 2023-05-03 DIAGNOSIS — A60.00 GENITAL HERPES SIMPLEX, UNSPECIFIED SITE: ICD-10-CM

## 2023-05-03 PROCEDURE — 3074F PR MOST RECENT SYSTOLIC BLOOD PRESSURE < 130 MM HG: ICD-10-PCS | Mod: CPTII,,, | Performed by: FAMILY MEDICINE

## 2023-05-03 PROCEDURE — 1159F PR MEDICATION LIST DOCUMENTED IN MEDICAL RECORD: ICD-10-PCS | Mod: CPTII,,, | Performed by: FAMILY MEDICINE

## 2023-05-03 PROCEDURE — 99999 PR PBB SHADOW E&M-EST. PATIENT-LVL III: ICD-10-PCS | Mod: PBBFAC,,, | Performed by: FAMILY MEDICINE

## 2023-05-03 PROCEDURE — 1160F RVW MEDS BY RX/DR IN RCRD: CPT | Mod: CPTII,,, | Performed by: FAMILY MEDICINE

## 2023-05-03 PROCEDURE — 99213 OFFICE O/P EST LOW 20 MIN: CPT | Mod: PBBFAC,PN | Performed by: FAMILY MEDICINE

## 2023-05-03 PROCEDURE — 1159F MED LIST DOCD IN RCRD: CPT | Mod: CPTII,,, | Performed by: FAMILY MEDICINE

## 2023-05-03 PROCEDURE — 99395 PREV VISIT EST AGE 18-39: CPT | Mod: S$PBB,,, | Performed by: FAMILY MEDICINE

## 2023-05-03 PROCEDURE — 3078F PR MOST RECENT DIASTOLIC BLOOD PRESSURE < 80 MM HG: ICD-10-PCS | Mod: CPTII,,, | Performed by: FAMILY MEDICINE

## 2023-05-03 PROCEDURE — 99395 PR PREVENTIVE VISIT,EST,18-39: ICD-10-PCS | Mod: S$PBB,,, | Performed by: FAMILY MEDICINE

## 2023-05-03 PROCEDURE — 3078F DIAST BP <80 MM HG: CPT | Mod: CPTII,,, | Performed by: FAMILY MEDICINE

## 2023-05-03 PROCEDURE — 1160F PR REVIEW ALL MEDS BY PRESCRIBER/CLIN PHARMACIST DOCUMENTED: ICD-10-PCS | Mod: CPTII,,, | Performed by: FAMILY MEDICINE

## 2023-05-03 PROCEDURE — 3074F SYST BP LT 130 MM HG: CPT | Mod: CPTII,,, | Performed by: FAMILY MEDICINE

## 2023-05-03 PROCEDURE — 3008F BODY MASS INDEX DOCD: CPT | Mod: CPTII,,, | Performed by: FAMILY MEDICINE

## 2023-05-03 PROCEDURE — 99999 PR PBB SHADOW E&M-EST. PATIENT-LVL III: CPT | Mod: PBBFAC,,, | Performed by: FAMILY MEDICINE

## 2023-05-03 PROCEDURE — 3008F PR BODY MASS INDEX (BMI) DOCUMENTED: ICD-10-PCS | Mod: CPTII,,, | Performed by: FAMILY MEDICINE

## 2023-05-03 RX ORDER — VALACYCLOVIR HYDROCHLORIDE 500 MG/1
500 TABLET, FILM COATED ORAL 2 TIMES DAILY
Qty: 60 TABLET | Refills: 11 | Status: SHIPPED | OUTPATIENT
Start: 2023-05-03 | End: 2023-05-04 | Stop reason: SDUPTHER

## 2023-05-03 RX ORDER — INSULIN LISPRO 100 [IU]/ML
INJECTION, SOLUTION INTRAVENOUS; SUBCUTANEOUS
COMMUNITY
Start: 2023-04-24

## 2023-05-03 RX ORDER — VALACYCLOVIR HYDROCHLORIDE 500 MG/1
500 TABLET, FILM COATED ORAL 2 TIMES DAILY
COMMUNITY
Start: 2023-04-14 | End: 2023-05-03 | Stop reason: SDUPTHER

## 2023-05-03 RX ORDER — FLUOXETINE HYDROCHLORIDE 20 MG/1
20 CAPSULE ORAL DAILY
Qty: 30 CAPSULE | Refills: 11 | Status: SHIPPED | OUTPATIENT
Start: 2023-05-03 | End: 2023-05-04 | Stop reason: SDUPTHER

## 2023-05-03 NOTE — PROGRESS NOTES
Subjective     Patient ID: Karie Perez is a 27 y.o. female.    Chief Complaint: Establish Care    HPI:  Patient is a 27 year old female, with Type 1 diabetes, genital herpes, asthma, and postpartum depression here to establish care. Patient is being followed by endocrine at [facility].  Has complication of retinopathy and hypoglycemia.  Patient reports recurrent depression, had been off Prozac x 6 months.  Reports low energy and feeling page.  No recent herpes outbreaks, takes Valtrex daily.  Review of Systems   Constitutional:  Positive for fatigue.   Endocrine:        Night sweats due to hypoglycemia   Psychiatric/Behavioral:  Positive for dysphoric mood. Negative for sleep disturbance and suicidal ideas.    All other systems reviewed and are negative.       Objective     Physical Exam  Constitutional:       Appearance: Normal appearance. She is well-developed.   HENT:      Head: Normocephalic and atraumatic.   Eyes:      Conjunctiva/sclera: Conjunctivae normal.      Pupils: Pupils are equal, round, and reactive to light.   Neck:      Thyroid: No thyromegaly.   Cardiovascular:      Rate and Rhythm: Normal rate and regular rhythm.      Heart sounds: Normal heart sounds. No murmur heard.  Pulmonary:      Effort: Pulmonary effort is normal. No respiratory distress.      Breath sounds: Normal breath sounds. No wheezing or rales.   Abdominal:      General: Bowel sounds are normal. There is no distension.      Palpations: Abdomen is soft. There is no mass.      Tenderness: There is no abdominal tenderness.   Musculoskeletal:      Cervical back: Normal range of motion and neck supple.   Lymphadenopathy:      Cervical: No cervical adenopathy.   Skin:     General: Skin is warm and dry.      Findings: No rash.   Neurological:      Mental Status: She is alert and oriented to person, place, and time.   Psychiatric:         Mood and Affect: Mood normal.          Assessment and Plan     Problem List Items Addressed This Visit     None  Visit Diagnoses       Well adult exam    -  Primary    Type 1 diabetes mellitus with background diabetic retinopathy        Relevant Medications    HUMALOG U-100 INSULIN 100 unit/mL injection    insulin lispro 100 unit/mL injection        Karie was seen today for establish care.    Diagnoses and all orders for this visit:    Well adult exam  Encourage exercise, increased water intake and compliance with diet.    Type 1 diabetes mellitus with background diabetic retinopathy  Patient with hypoglycemia, managed by endocrine.    Asthma, mild intermittent, well-controlled  No recent asthma exacerbations.    Genital herpes simplex, unspecified site  No recent exacerbations  -     valACYclovir (VALTREX) 500 MG tablet; Take 1 tablet (500 mg total) by mouth 2 (two) times daily.    Moderate episode of recurrent major depressive disorder  Patient with worsening depressive symptoms, will resume Prozac.    -     FLUoxetine 20 MG capsule; Take 1 capsule (20 mg total) by mouth once daily.

## 2023-05-03 NOTE — Clinical Note
Please obtain records of care gaps on patient done at [facility] [place]

## 2023-05-04 RX ORDER — VALACYCLOVIR HYDROCHLORIDE 500 MG/1
500 TABLET, FILM COATED ORAL 2 TIMES DAILY
Qty: 60 TABLET | Refills: 11 | Status: SHIPPED | OUTPATIENT
Start: 2023-05-04

## 2023-05-04 RX ORDER — FLUOXETINE HYDROCHLORIDE 20 MG/1
20 CAPSULE ORAL DAILY
Qty: 30 CAPSULE | Refills: 11 | Status: SHIPPED | OUTPATIENT
Start: 2023-05-04 | End: 2024-05-03

## 2023-05-04 NOTE — TELEPHONE ENCOUNTER
Pt requesting recent prescribed medications to be sent [facility] instead of [facility]. Pharmacy updated in chart.

## 2023-05-05 DIAGNOSIS — E11.9 TYPE 2 DIABETES MELLITUS WITHOUT COMPLICATION: ICD-10-CM

## 2023-06-22 ENCOUNTER — PATIENT MESSAGE (OUTPATIENT)
Dept: PRIMARY CARE CLINIC | Facility: CLINIC | Age: 28
End: 2023-06-22
Payer: MEDICAID

## 2023-08-02 DIAGNOSIS — E11.9 TYPE 2 DIABETES MELLITUS WITHOUT COMPLICATION: ICD-10-CM

## 2023-10-18 ENCOUNTER — PATIENT MESSAGE (OUTPATIENT)
Dept: ADMINISTRATIVE | Facility: HOSPITAL | Age: 28
End: 2023-10-18
Payer: MEDICAID

## 2023-10-20 DIAGNOSIS — E11.9 TYPE 2 DIABETES MELLITUS WITHOUT COMPLICATION, UNSPECIFIED WHETHER LONG TERM INSULIN USE: ICD-10-CM

## 2023-11-17 ENCOUNTER — PATIENT MESSAGE (OUTPATIENT)
Dept: ADMINISTRATIVE | Facility: HOSPITAL | Age: 28
End: 2023-11-17
Payer: MEDICAID

## 2024-02-26 ENCOUNTER — PATIENT MESSAGE (OUTPATIENT)
Dept: OPTOMETRY | Facility: CLINIC | Age: 29
End: 2024-02-26
Payer: MEDICAID

## 2024-05-02 DIAGNOSIS — E10.10 DIABETIC KETOACIDOSIS WITHOUT COMA ASSOCIATED WITH TYPE 1 DIABETES MELLITUS: ICD-10-CM

## 2024-05-02 DIAGNOSIS — E11.9 TYPE 2 DIABETES MELLITUS WITHOUT COMPLICATION: ICD-10-CM

## 2024-07-11 DIAGNOSIS — E11.9 TYPE 2 DIABETES MELLITUS WITHOUT COMPLICATION: ICD-10-CM

## 2025-07-15 ENCOUNTER — PATIENT MESSAGE (OUTPATIENT)
Dept: PRIMARY CARE CLINIC | Facility: CLINIC | Age: 30
End: 2025-07-15
Payer: MEDICAID